# Patient Record
Sex: FEMALE | Race: WHITE | NOT HISPANIC OR LATINO
[De-identification: names, ages, dates, MRNs, and addresses within clinical notes are randomized per-mention and may not be internally consistent; named-entity substitution may affect disease eponyms.]

---

## 2017-09-28 ENCOUNTER — RECORDS - HEALTHEAST (OUTPATIENT)
Dept: ADMINISTRATIVE | Facility: OTHER | Age: 57
End: 2017-09-28

## 2019-03-08 ENCOUNTER — COMMUNICATION - HEALTHEAST (OUTPATIENT)
Dept: NEUROSURGERY | Facility: CLINIC | Age: 59
End: 2019-03-08

## 2019-03-11 ENCOUNTER — AMBULATORY - HEALTHEAST (OUTPATIENT)
Dept: NEUROSURGERY | Facility: CLINIC | Age: 59
End: 2019-03-11

## 2019-03-11 DIAGNOSIS — M54.9 BACK PAIN: ICD-10-CM

## 2019-04-18 ENCOUNTER — HOSPITAL ENCOUNTER (OUTPATIENT)
Dept: RADIOLOGY | Facility: CLINIC | Age: 59
Discharge: HOME | End: 2019-04-18
Attending: NEUROLOGICAL SURGERY

## 2019-04-18 ENCOUNTER — HOSPITAL ENCOUNTER (OUTPATIENT)
Dept: MRI IMAGING | Facility: CLINIC | Age: 59
Discharge: HOME | End: 2019-04-18
Attending: NEUROLOGICAL SURGERY

## 2019-04-18 DIAGNOSIS — M54.9 BACK PAIN: ICD-10-CM

## 2019-04-30 ENCOUNTER — OFFICE VISIT - HEALTHEAST (OUTPATIENT)
Dept: NEUROSURGERY | Facility: CLINIC | Age: 59
End: 2019-04-30

## 2019-04-30 DIAGNOSIS — M43.16 SPONDYLOLISTHESIS OF LUMBAR REGION: ICD-10-CM

## 2019-04-30 ASSESSMENT — MIFFLIN-ST. JEOR: SCORE: 1303.44

## 2019-05-02 ENCOUNTER — AMBULATORY - HEALTHEAST (OUTPATIENT)
Dept: NEUROSURGERY | Facility: CLINIC | Age: 59
End: 2019-05-02

## 2019-05-02 DIAGNOSIS — M54.9 BACK PAIN: ICD-10-CM

## 2019-05-03 ENCOUNTER — OFFICE VISIT - HEALTHEAST (OUTPATIENT)
Dept: PHYSICAL THERAPY | Facility: REHABILITATION | Age: 59
End: 2019-05-03

## 2019-05-03 DIAGNOSIS — R26.81 UNSTEADINESS ON FEET: ICD-10-CM

## 2019-05-03 DIAGNOSIS — M43.16 SPONDYLOLISTHESIS OF LUMBAR REGION: ICD-10-CM

## 2019-05-03 DIAGNOSIS — M62.81 GENERALIZED MUSCLE WEAKNESS: ICD-10-CM

## 2019-05-07 ENCOUNTER — OFFICE VISIT - HEALTHEAST (OUTPATIENT)
Dept: PHYSICAL THERAPY | Facility: REHABILITATION | Age: 59
End: 2019-05-07

## 2019-05-07 DIAGNOSIS — M43.16 SPONDYLOLISTHESIS OF LUMBAR REGION: ICD-10-CM

## 2019-05-07 DIAGNOSIS — M62.81 GENERALIZED MUSCLE WEAKNESS: ICD-10-CM

## 2019-05-07 DIAGNOSIS — R26.81 UNSTEADINESS ON FEET: ICD-10-CM

## 2019-05-10 ENCOUNTER — COMMUNICATION - HEALTHEAST (OUTPATIENT)
Dept: NEUROSURGERY | Facility: CLINIC | Age: 59
End: 2019-05-10

## 2019-05-10 ENCOUNTER — OFFICE VISIT - HEALTHEAST (OUTPATIENT)
Dept: PHYSICAL THERAPY | Facility: REHABILITATION | Age: 59
End: 2019-05-10

## 2019-05-10 DIAGNOSIS — M62.81 GENERALIZED MUSCLE WEAKNESS: ICD-10-CM

## 2019-05-10 DIAGNOSIS — R26.81 UNSTEADINESS ON FEET: ICD-10-CM

## 2019-05-10 DIAGNOSIS — M43.16 SPONDYLOLISTHESIS OF LUMBAR REGION: ICD-10-CM

## 2019-05-14 ENCOUNTER — OFFICE VISIT - HEALTHEAST (OUTPATIENT)
Dept: PHYSICAL THERAPY | Facility: REHABILITATION | Age: 59
End: 2019-05-14

## 2019-05-14 DIAGNOSIS — R26.81 UNSTEADINESS ON FEET: ICD-10-CM

## 2019-05-14 DIAGNOSIS — M43.16 SPONDYLOLISTHESIS OF LUMBAR REGION: ICD-10-CM

## 2019-05-14 DIAGNOSIS — M62.81 GENERALIZED MUSCLE WEAKNESS: ICD-10-CM

## 2019-05-17 ENCOUNTER — OFFICE VISIT - HEALTHEAST (OUTPATIENT)
Dept: PHYSICAL THERAPY | Facility: REHABILITATION | Age: 59
End: 2019-05-17

## 2019-05-17 DIAGNOSIS — M43.16 SPONDYLOLISTHESIS OF LUMBAR REGION: ICD-10-CM

## 2019-05-17 DIAGNOSIS — R26.81 UNSTEADINESS ON FEET: ICD-10-CM

## 2019-05-17 DIAGNOSIS — M62.81 GENERALIZED MUSCLE WEAKNESS: ICD-10-CM

## 2019-05-24 ENCOUNTER — OFFICE VISIT - HEALTHEAST (OUTPATIENT)
Dept: PHYSICAL THERAPY | Facility: REHABILITATION | Age: 59
End: 2019-05-24

## 2019-05-24 DIAGNOSIS — R26.81 UNSTEADINESS ON FEET: ICD-10-CM

## 2019-05-24 DIAGNOSIS — M62.81 GENERALIZED MUSCLE WEAKNESS: ICD-10-CM

## 2019-05-24 DIAGNOSIS — M43.16 SPONDYLOLISTHESIS OF LUMBAR REGION: ICD-10-CM

## 2019-05-28 ENCOUNTER — OFFICE VISIT - HEALTHEAST (OUTPATIENT)
Dept: PHYSICAL THERAPY | Facility: REHABILITATION | Age: 59
End: 2019-05-28

## 2019-05-28 DIAGNOSIS — R26.81 UNSTEADINESS ON FEET: ICD-10-CM

## 2019-05-28 DIAGNOSIS — M43.16 SPONDYLOLISTHESIS OF LUMBAR REGION: ICD-10-CM

## 2019-05-28 DIAGNOSIS — M62.81 GENERALIZED MUSCLE WEAKNESS: ICD-10-CM

## 2019-05-31 ENCOUNTER — OFFICE VISIT - HEALTHEAST (OUTPATIENT)
Dept: PHYSICAL THERAPY | Facility: REHABILITATION | Age: 59
End: 2019-05-31

## 2019-05-31 DIAGNOSIS — R26.81 UNSTEADINESS ON FEET: ICD-10-CM

## 2019-05-31 DIAGNOSIS — M43.16 SPONDYLOLISTHESIS OF LUMBAR REGION: ICD-10-CM

## 2019-05-31 DIAGNOSIS — M62.81 GENERALIZED MUSCLE WEAKNESS: ICD-10-CM

## 2019-06-10 ENCOUNTER — COMMUNICATION - HEALTHEAST (OUTPATIENT)
Dept: NEUROSURGERY | Facility: CLINIC | Age: 59
End: 2019-06-10

## 2019-06-13 ENCOUNTER — AMBULATORY - HEALTHEAST (OUTPATIENT)
Dept: NEUROSURGERY | Facility: CLINIC | Age: 59
End: 2019-06-13

## 2019-06-14 ENCOUNTER — AMBULATORY - HEALTHEAST (OUTPATIENT)
Dept: NEUROSURGERY | Facility: CLINIC | Age: 59
End: 2019-06-14

## 2019-06-14 DIAGNOSIS — M25.551 BILATERAL HIP PAIN: ICD-10-CM

## 2019-06-14 DIAGNOSIS — M25.552 BILATERAL HIP PAIN: ICD-10-CM

## 2019-06-14 DIAGNOSIS — M54.9 BACK PAIN: ICD-10-CM

## 2019-07-01 ENCOUNTER — AMBULATORY - HEALTHEAST (OUTPATIENT)
Dept: NEUROSURGERY | Facility: CLINIC | Age: 59
End: 2019-07-01

## 2019-07-01 ENCOUNTER — COMMUNICATION - HEALTHEAST (OUTPATIENT)
Dept: NEUROSURGERY | Facility: CLINIC | Age: 59
End: 2019-07-01

## 2019-07-01 DIAGNOSIS — M54.16 LUMBAR RADICULOPATHY: ICD-10-CM

## 2019-07-02 ENCOUNTER — AMBULATORY - HEALTHEAST (OUTPATIENT)
Dept: NEUROSURGERY | Facility: CLINIC | Age: 59
End: 2019-07-02

## 2019-07-17 ENCOUNTER — AMBULATORY - HEALTHEAST (OUTPATIENT)
Dept: NEUROSURGERY | Facility: CLINIC | Age: 59
End: 2019-07-17

## 2019-07-31 ENCOUNTER — HOSPITAL ENCOUNTER (OUTPATIENT)
Dept: PHYSICAL MEDICINE AND REHAB | Facility: CLINIC | Age: 59
Discharge: HOME | End: 2019-07-31
Attending: NEUROLOGICAL SURGERY

## 2019-07-31 DIAGNOSIS — M54.16 LUMBAR RADICULOPATHY: ICD-10-CM

## 2019-07-31 RX ORDER — DULOXETIN HYDROCHLORIDE 60 MG/1
60 CAPSULE, DELAYED RELEASE ORAL DAILY
Status: SHIPPED | COMMUNITY
Start: 2019-07-31

## 2019-07-31 RX ORDER — BUPROPION HYDROCHLORIDE 150 MG/1
TABLET ORAL
Refills: 1 | Status: SHIPPED | COMMUNITY
Start: 2019-06-27

## 2019-08-15 ENCOUNTER — COMMUNICATION - HEALTHEAST (OUTPATIENT)
Dept: NEUROSURGERY | Facility: CLINIC | Age: 59
End: 2019-08-15

## 2019-10-22 ENCOUNTER — COMMUNICATION - HEALTHEAST (OUTPATIENT)
Dept: NEUROSURGERY | Facility: CLINIC | Age: 59
End: 2019-10-22

## 2019-10-22 ENCOUNTER — AMBULATORY - HEALTHEAST (OUTPATIENT)
Dept: NEUROSURGERY | Facility: CLINIC | Age: 59
End: 2019-10-22

## 2019-10-22 DIAGNOSIS — M54.16 LUMBAR RADICULOPATHY: ICD-10-CM

## 2019-10-22 DIAGNOSIS — M47.16 OTHER SPONDYLOSIS WITH MYELOPATHY, LUMBAR REGION: ICD-10-CM

## 2019-11-07 ENCOUNTER — COMMUNICATION - HEALTHEAST (OUTPATIENT)
Dept: PHYSICAL MEDICINE AND REHAB | Facility: CLINIC | Age: 59
End: 2019-11-07

## 2019-11-25 ENCOUNTER — HOSPITAL ENCOUNTER (OUTPATIENT)
Dept: PHYSICAL MEDICINE AND REHAB | Facility: CLINIC | Age: 59
Discharge: HOME | End: 2019-11-25
Attending: NEUROLOGICAL SURGERY

## 2019-11-25 DIAGNOSIS — M54.16 LUMBAR RADICULOPATHY: ICD-10-CM

## 2019-11-25 DIAGNOSIS — M47.16 OTHER SPONDYLOSIS WITH MYELOPATHY, LUMBAR REGION: ICD-10-CM

## 2019-11-25 RX ORDER — FAMOTIDINE 40 MG/1
40 TABLET, FILM COATED ORAL DAILY
Status: SHIPPED | COMMUNITY
Start: 2019-11-25

## 2019-12-13 ENCOUNTER — HOSPITAL ENCOUNTER (OUTPATIENT)
Dept: PHYSICAL MEDICINE AND REHAB | Facility: CLINIC | Age: 59
Discharge: HOME | End: 2019-12-13
Attending: PHYSICAL MEDICINE & REHABILITATION

## 2019-12-13 DIAGNOSIS — M47.816 ARTHROPATHY OF LUMBAR FACET JOINT: ICD-10-CM

## 2019-12-13 DIAGNOSIS — M54.50 LUMBAR SPINE PAIN: ICD-10-CM

## 2019-12-13 DIAGNOSIS — M79.18 MYOFASCIAL PAIN: ICD-10-CM

## 2019-12-13 DIAGNOSIS — M43.16 SPONDYLOLISTHESIS OF LUMBAR REGION: ICD-10-CM

## 2019-12-13 ASSESSMENT — MIFFLIN-ST. JEOR: SCORE: 1289.83

## 2020-01-07 ENCOUNTER — HOSPITAL ENCOUNTER (OUTPATIENT)
Dept: PHYSICAL MEDICINE AND REHAB | Facility: CLINIC | Age: 60
Discharge: HOME | End: 2020-01-07
Attending: PHYSICAL MEDICINE & REHABILITATION

## 2020-01-07 DIAGNOSIS — M43.16 SPONDYLOLISTHESIS OF LUMBAR REGION: ICD-10-CM

## 2020-01-07 DIAGNOSIS — M54.50 LUMBAR SPINE PAIN: ICD-10-CM

## 2020-01-07 DIAGNOSIS — M47.816 ARTHROPATHY OF LUMBAR FACET JOINT: ICD-10-CM

## 2020-04-16 ENCOUNTER — COMMUNICATION - HEALTHEAST (OUTPATIENT)
Dept: PHYSICAL MEDICINE AND REHAB | Facility: CLINIC | Age: 60
End: 2020-04-16

## 2020-04-27 ENCOUNTER — HOSPITAL ENCOUNTER (OUTPATIENT)
Dept: PHYSICAL MEDICINE AND REHAB | Facility: CLINIC | Age: 60
Discharge: HOME | End: 2020-04-27
Attending: PHYSICAL MEDICINE & REHABILITATION

## 2020-04-27 ENCOUNTER — COMMUNICATION - HEALTHEAST (OUTPATIENT)
Dept: TELEHEALTH | Facility: CLINIC | Age: 60
End: 2020-04-27

## 2020-04-27 DIAGNOSIS — M79.18 MYOFASCIAL PAIN: ICD-10-CM

## 2020-04-27 DIAGNOSIS — M43.16 SPONDYLOLISTHESIS OF LUMBAR REGION: ICD-10-CM

## 2020-04-27 DIAGNOSIS — M54.50 LUMBAR SPINE PAIN: ICD-10-CM

## 2020-04-27 DIAGNOSIS — M47.816 ARTHROPATHY OF LUMBAR FACET JOINT: ICD-10-CM

## 2020-04-27 RX ORDER — METHOCARBAMOL 500 MG/1
500 TABLET, FILM COATED ORAL 3 TIMES DAILY PRN
Qty: 30 TABLET | Refills: 0 | Status: SHIPPED | OUTPATIENT
Start: 2020-04-27

## 2020-04-28 ENCOUNTER — COMMUNICATION - HEALTHEAST (OUTPATIENT)
Dept: PHYSICAL MEDICINE AND REHAB | Facility: CLINIC | Age: 60
End: 2020-04-28

## 2020-04-28 DIAGNOSIS — M79.18 MYOFASCIAL MUSCLE PAIN: ICD-10-CM

## 2020-06-30 ENCOUNTER — COMMUNICATION - HEALTHEAST (OUTPATIENT)
Dept: TELEHEALTH | Facility: CLINIC | Age: 60
End: 2020-06-30

## 2020-06-30 ENCOUNTER — HOSPITAL ENCOUNTER (OUTPATIENT)
Dept: PHYSICAL MEDICINE AND REHAB | Facility: CLINIC | Age: 60
Discharge: HOME | End: 2020-06-30
Attending: PHYSICAL MEDICINE & REHABILITATION

## 2020-06-30 DIAGNOSIS — M43.16 SPONDYLOLISTHESIS OF LUMBAR REGION: ICD-10-CM

## 2020-06-30 DIAGNOSIS — M47.816 ARTHROPATHY OF LUMBAR FACET JOINT: ICD-10-CM

## 2020-06-30 DIAGNOSIS — M54.50 LUMBAR SPINE PAIN: ICD-10-CM

## 2020-07-17 ENCOUNTER — HOSPITAL ENCOUNTER (OUTPATIENT)
Dept: PHYSICAL MEDICINE AND REHAB | Facility: CLINIC | Age: 60
Discharge: HOME | End: 2020-07-17
Attending: PHYSICAL MEDICINE & REHABILITATION

## 2020-07-17 DIAGNOSIS — M79.18 MYOFASCIAL PAIN: ICD-10-CM

## 2020-07-17 DIAGNOSIS — M48.061 BILATERAL STENOSIS OF LATERAL RECESS OF LUMBAR SPINE: ICD-10-CM

## 2020-07-17 DIAGNOSIS — M47.816 ARTHROPATHY OF LUMBAR FACET JOINT: ICD-10-CM

## 2020-07-17 DIAGNOSIS — M48.061 FORAMINAL STENOSIS OF LUMBAR REGION: ICD-10-CM

## 2020-07-17 DIAGNOSIS — M43.16 SPONDYLOLISTHESIS OF LUMBAR REGION: ICD-10-CM

## 2020-07-17 DIAGNOSIS — M79.18 MYOFASCIAL MUSCLE PAIN: ICD-10-CM

## 2020-07-17 DIAGNOSIS — M54.16 LUMBAR RADICULAR PAIN: ICD-10-CM

## 2020-07-17 DIAGNOSIS — M70.61 TROCHANTERIC BURSITIS OF BOTH HIPS: ICD-10-CM

## 2020-07-17 DIAGNOSIS — M70.62 TROCHANTERIC BURSITIS OF BOTH HIPS: ICD-10-CM

## 2020-07-17 ASSESSMENT — MIFFLIN-ST. JEOR: SCORE: 1289.83

## 2020-07-28 ENCOUNTER — HOSPITAL ENCOUNTER (OUTPATIENT)
Dept: PHYSICAL MEDICINE AND REHAB | Facility: CLINIC | Age: 60
Discharge: HOME | End: 2020-07-28
Attending: PHYSICAL MEDICINE & REHABILITATION

## 2020-07-28 DIAGNOSIS — M54.16 LUMBAR RADICULAR PAIN: ICD-10-CM

## 2020-07-28 DIAGNOSIS — M48.061 FORAMINAL STENOSIS OF LUMBAR REGION: ICD-10-CM

## 2020-08-11 ENCOUNTER — HOSPITAL ENCOUNTER (OUTPATIENT)
Dept: PHYSICAL MEDICINE AND REHAB | Facility: CLINIC | Age: 60
Discharge: HOME | End: 2020-08-11
Attending: PHYSICAL MEDICINE & REHABILITATION

## 2020-08-11 DIAGNOSIS — M53.3 SACROILIAC JOINT PAIN: ICD-10-CM

## 2020-08-11 DIAGNOSIS — M54.16 LUMBAR RADICULAR PAIN: ICD-10-CM

## 2020-08-11 DIAGNOSIS — M48.061 FORAMINAL STENOSIS OF LUMBAR REGION: ICD-10-CM

## 2020-08-11 DIAGNOSIS — M43.16 SPONDYLOLISTHESIS OF LUMBAR REGION: ICD-10-CM

## 2020-08-11 DIAGNOSIS — M47.816 ARTHROPATHY OF LUMBAR FACET JOINT: ICD-10-CM

## 2020-08-11 DIAGNOSIS — M79.18 MYOFASCIAL PAIN: ICD-10-CM

## 2020-08-15 ENCOUNTER — HOSPITAL ENCOUNTER (OUTPATIENT)
Dept: MRI IMAGING | Facility: CLINIC | Age: 60
Discharge: HOME | End: 2020-08-15
Attending: PHYSICAL MEDICINE & REHABILITATION

## 2020-08-15 ENCOUNTER — HOSPITAL ENCOUNTER (OUTPATIENT)
Dept: RADIOLOGY | Facility: CLINIC | Age: 60
Discharge: HOME | End: 2020-08-15
Attending: PHYSICAL MEDICINE & REHABILITATION

## 2020-08-15 DIAGNOSIS — M43.16 SPONDYLOLISTHESIS OF LUMBAR REGION: ICD-10-CM

## 2020-08-15 DIAGNOSIS — M48.061 FORAMINAL STENOSIS OF LUMBAR REGION: ICD-10-CM

## 2020-08-15 DIAGNOSIS — M54.16 LUMBAR RADICULAR PAIN: ICD-10-CM

## 2020-08-15 DIAGNOSIS — M47.816 ARTHROPATHY OF LUMBAR FACET JOINT: ICD-10-CM

## 2020-08-17 ENCOUNTER — COMMUNICATION - HEALTHEAST (OUTPATIENT)
Dept: PHYSICAL MEDICINE AND REHAB | Facility: CLINIC | Age: 60
End: 2020-08-17

## 2020-08-18 ENCOUNTER — HOSPITAL ENCOUNTER (OUTPATIENT)
Dept: PHYSICAL MEDICINE AND REHAB | Facility: CLINIC | Age: 60
Discharge: HOME | End: 2020-08-18
Attending: PHYSICAL MEDICINE & REHABILITATION

## 2020-08-18 DIAGNOSIS — M53.3 SACROILIAC JOINT PAIN: ICD-10-CM

## 2020-09-01 ENCOUNTER — HOSPITAL ENCOUNTER (OUTPATIENT)
Dept: PHYSICAL MEDICINE AND REHAB | Facility: CLINIC | Age: 60
Discharge: HOME | End: 2020-09-01
Attending: PHYSICAL MEDICINE & REHABILITATION

## 2020-09-01 DIAGNOSIS — M53.3 SACROILIAC JOINT PAIN: ICD-10-CM

## 2020-09-01 DIAGNOSIS — M47.816 ARTHROPATHY OF LUMBAR FACET JOINT: ICD-10-CM

## 2020-09-01 DIAGNOSIS — M43.16 SPONDYLOLISTHESIS OF LUMBAR REGION: ICD-10-CM

## 2020-09-01 DIAGNOSIS — M70.61 TROCHANTERIC BURSITIS OF BOTH HIPS: ICD-10-CM

## 2020-09-01 DIAGNOSIS — M54.16 LUMBAR RADICULAR PAIN: ICD-10-CM

## 2020-09-01 DIAGNOSIS — M79.18 MYOFASCIAL PAIN: ICD-10-CM

## 2020-09-01 DIAGNOSIS — M48.061 FORAMINAL STENOSIS OF LUMBAR REGION: ICD-10-CM

## 2020-09-01 DIAGNOSIS — M70.62 TROCHANTERIC BURSITIS OF BOTH HIPS: ICD-10-CM

## 2020-09-15 ENCOUNTER — OFFICE VISIT - HEALTHEAST (OUTPATIENT)
Dept: NEUROSURGERY | Facility: CLINIC | Age: 60
End: 2020-09-15

## 2020-09-15 DIAGNOSIS — M85.80 OTHER SPECIFIED DISORDERS OF BONE DENSITY AND STRUCTURE, UNSPECIFIED SITE: ICD-10-CM

## 2020-09-15 DIAGNOSIS — M54.16 LUMBAR RADICULOPATHY: ICD-10-CM

## 2020-09-15 ASSESSMENT — MIFFLIN-ST. JEOR: SCORE: 1312.51

## 2020-09-17 ENCOUNTER — COMMUNICATION - HEALTHEAST (OUTPATIENT)
Dept: NEUROSURGERY | Facility: CLINIC | Age: 60
End: 2020-09-17

## 2020-09-21 ENCOUNTER — OFFICE VISIT - HEALTHEAST (OUTPATIENT)
Dept: PHYSICAL THERAPY | Facility: REHABILITATION | Age: 60
End: 2020-09-21

## 2020-09-21 DIAGNOSIS — M43.16 SPONDYLOLISTHESIS OF LUMBAR REGION: ICD-10-CM

## 2020-09-21 DIAGNOSIS — M48.061 FORAMINAL STENOSIS OF LUMBAR REGION: ICD-10-CM

## 2020-09-21 DIAGNOSIS — M79.18 MYOFASCIAL PAIN: ICD-10-CM

## 2020-09-21 DIAGNOSIS — M70.61 TROCHANTERIC BURSITIS OF BOTH HIPS: ICD-10-CM

## 2020-09-21 DIAGNOSIS — M70.62 TROCHANTERIC BURSITIS OF BOTH HIPS: ICD-10-CM

## 2020-09-21 DIAGNOSIS — M53.3 SACROILIAC JOINT PAIN: ICD-10-CM

## 2020-09-21 DIAGNOSIS — M47.816 ARTHROPATHY OF LUMBAR FACET JOINT: ICD-10-CM

## 2020-09-21 DIAGNOSIS — M54.16 LUMBAR RADICULAR PAIN: ICD-10-CM

## 2020-09-30 ENCOUNTER — OFFICE VISIT - HEALTHEAST (OUTPATIENT)
Dept: PHYSICAL THERAPY | Facility: REHABILITATION | Age: 60
End: 2020-09-30

## 2020-09-30 DIAGNOSIS — M54.16 LUMBAR RADICULAR PAIN: ICD-10-CM

## 2020-09-30 DIAGNOSIS — M43.16 SPONDYLOLISTHESIS OF LUMBAR REGION: ICD-10-CM

## 2020-09-30 DIAGNOSIS — M70.62 TROCHANTERIC BURSITIS OF BOTH HIPS: ICD-10-CM

## 2020-09-30 DIAGNOSIS — M53.3 SACROILIAC JOINT PAIN: ICD-10-CM

## 2020-09-30 DIAGNOSIS — M47.816 ARTHROPATHY OF LUMBAR FACET JOINT: ICD-10-CM

## 2020-09-30 DIAGNOSIS — M70.61 TROCHANTERIC BURSITIS OF BOTH HIPS: ICD-10-CM

## 2020-09-30 DIAGNOSIS — M79.18 MYOFASCIAL PAIN: ICD-10-CM

## 2020-09-30 DIAGNOSIS — M48.061 FORAMINAL STENOSIS OF LUMBAR REGION: ICD-10-CM

## 2020-10-05 ENCOUNTER — RECORDS - HEALTHEAST (OUTPATIENT)
Dept: ADMINISTRATIVE | Facility: OTHER | Age: 60
End: 2020-10-05

## 2020-10-07 ENCOUNTER — OFFICE VISIT - HEALTHEAST (OUTPATIENT)
Dept: PHYSICAL THERAPY | Facility: REHABILITATION | Age: 60
End: 2020-10-07

## 2020-10-07 DIAGNOSIS — M48.061 FORAMINAL STENOSIS OF LUMBAR REGION: ICD-10-CM

## 2020-10-07 DIAGNOSIS — M79.18 MYOFASCIAL PAIN: ICD-10-CM

## 2020-10-07 DIAGNOSIS — M54.16 LUMBAR RADICULAR PAIN: ICD-10-CM

## 2020-10-07 DIAGNOSIS — M53.3 SACROILIAC JOINT PAIN: ICD-10-CM

## 2020-10-07 DIAGNOSIS — M47.816 ARTHROPATHY OF LUMBAR FACET JOINT: ICD-10-CM

## 2020-10-26 ENCOUNTER — COMMUNICATION - HEALTHEAST (OUTPATIENT)
Dept: PHYSICAL MEDICINE AND REHAB | Facility: CLINIC | Age: 60
End: 2020-10-26

## 2020-10-26 DIAGNOSIS — M79.18 MYOFASCIAL MUSCLE PAIN: ICD-10-CM

## 2021-01-03 ENCOUNTER — COMMUNICATION - HEALTHEAST (OUTPATIENT)
Dept: PHYSICAL MEDICINE AND REHAB | Facility: CLINIC | Age: 61
End: 2021-01-03

## 2021-01-03 DIAGNOSIS — M79.18 MYOFASCIAL MUSCLE PAIN: ICD-10-CM

## 2021-02-08 ENCOUNTER — COMMUNICATION - HEALTHEAST (OUTPATIENT)
Dept: PHYSICAL MEDICINE AND REHAB | Facility: CLINIC | Age: 61
End: 2021-02-08

## 2021-02-08 DIAGNOSIS — M79.18 MYOFASCIAL MUSCLE PAIN: ICD-10-CM

## 2021-03-01 ENCOUNTER — COMMUNICATION - HEALTHEAST (OUTPATIENT)
Dept: PHYSICAL MEDICINE AND REHAB | Facility: CLINIC | Age: 61
End: 2021-03-01

## 2021-03-01 DIAGNOSIS — M79.18 MYOFASCIAL MUSCLE PAIN: ICD-10-CM

## 2021-03-29 ENCOUNTER — COMMUNICATION - HEALTHEAST (OUTPATIENT)
Dept: PHYSICAL MEDICINE AND REHAB | Facility: CLINIC | Age: 61
End: 2021-03-29

## 2021-03-29 DIAGNOSIS — M79.18 MYOFASCIAL MUSCLE PAIN: ICD-10-CM

## 2021-04-21 ENCOUNTER — COMMUNICATION - HEALTHEAST (OUTPATIENT)
Dept: PHYSICAL MEDICINE AND REHAB | Facility: CLINIC | Age: 61
End: 2021-04-21

## 2021-04-21 DIAGNOSIS — M79.18 MYOFASCIAL MUSCLE PAIN: ICD-10-CM

## 2021-05-13 ENCOUNTER — COMMUNICATION - HEALTHEAST (OUTPATIENT)
Dept: PHYSICAL MEDICINE AND REHAB | Facility: CLINIC | Age: 61
End: 2021-05-13

## 2021-05-13 DIAGNOSIS — M79.18 MYOFASCIAL MUSCLE PAIN: ICD-10-CM

## 2021-05-14 RX ORDER — CYCLOBENZAPRINE HCL 5 MG
TABLET ORAL
Qty: 60 TABLET | Refills: 0 | Status: SHIPPED | OUTPATIENT
Start: 2021-05-14

## 2021-05-25 ENCOUNTER — RECORDS - HEALTHEAST (OUTPATIENT)
Dept: ADMINISTRATIVE | Facility: CLINIC | Age: 61
End: 2021-05-25

## 2021-05-28 NOTE — TELEPHONE ENCOUNTER
PT called to state that she had an order placed for a brace and will not be using it because it is too expensive. She stated that she bought a brace from [employer]. If you have any questions or comments please call Mary @ [phone]

## 2021-05-28 NOTE — PROGRESS NOTES
"Neurosurgery consultation was requested by: Dr. Guo for evaluation of lumbar stenosis  Last seen in 2015  Pain: presents in bilateral low back \"for years\", recently has become more intense and persistent  Radicular Pain is present: in both legs R>L from the buttocks down   Lhermitte sign: denies  Motor complaints: weakness in both legs R>L  Sensory complaints: paresthesias in hands and feet  Gait and balance issues: unable to walk distance due to pain  Bowel or bladder issues: denies incontinence or saddle anesthesia  Duration of SX is: chronic  The symptoms are worse with: standing and walking  The symptoms are better with: laying  Injury: denies  Severity is: moderate  Patient has tried the following conservative measures: none  CARMEN score is: 48%  Susannah Franco RN, CNRN      "

## 2021-05-28 NOTE — PROGRESS NOTES
"[facility] Daily Progress     Patient Name: Leda Verde  Date: 5/14/2019  Visit #: 3  PTA visit #: 1  Referral Diagnosis: spondylolisthesis of lumbar region  Referring provider: Damien Collins*  Visit Diagnosis:     ICD-10-CM    1. Spondylolisthesis of lumbar region M43.16    2. Generalized muscle weakness M62.81    3. Unsteadiness on feet R26.81        Past Medical History:   Diagnosis Date     Anxiety      Depression      Hyperlipidemia          Assessment:   Patient tolerated session well today without increase in pain.    HEP/POC compliance is  good .  Patient demonstrates understanding/independence with home program.  Patient is benefitting from skilled physical therapy and is making steady progress toward functional goals.  Patient is appropriate to continue with skilled physical therapy intervention, as indicated by initial plan of care.    Goal Status:  Pt. will demonstrate/verbalize independence in self-management of condition in : 12 weeks  Pt. will be independent with home exercise program in : 12 weeks  Pt. will have improved quality of sleep: with less pain;waking less times/night;in 6 weeks  Pt. will be able to walk : 30 minutes;with less difficulty;with less pain;for household mobility;in 12 weeks;for community mobility;for exercise/recreation  Patient will stand : 30 minutes;with less pain;with less difficultty;in 6 weeks;for home chores    Patient will decrease : CARMEN score;by _ points;for improved quality of function;for improved quality of life;in 12 weeks  by ___ points: 6      Plan / Patient Education:   Pt did well with all exercises and balance drills.  Continue with initial plan of care.  Progress with home program as tolerated.    Exercises:  Exercise #1: SKTC and piriformis stretch 30\" holds x3 B  Comment #1: glut squeeze x10 with 5\" holds  Exercise #2: abd set x10 with 5\" holds  Comment #2: clamshell 5\" holds until fatigue B  Exercise #3: sit to stands until " "fatigue  Comment #3: SLS and tandem 1' B    NO NU' STEP PER PATIENT REQUEST- IT BOTHERS THE DISC IN HER NECK    Pt to hold on doing the SLS for now to see if that is causing L knee pain, will recheck this at her next appointment.    Subjective:   Pt states she is \"sore.\"   Pt states her L knee is bothering her. Pt states she is having L knee pain (has a HO of L knee pain) She thinks the SLS has been bothering her knee.  Pt did get a heel lift at [employer]. Pt with an Adjust a  heel lift. Pt removed two of the layers off.   Pain Ratin/10          Objective:       Gait: Pt with no AD today    Treatment Today     TREATMENT MINUTES COMMENTS   Evaluation     Self-care/ Home management     Manual therapy     Neuromuscular Re-education 15 On flat: rhomberg, 1/2 tandem 1' B, tandem 1' each B. On foam: 1/2 tandem and rhomberg 1' each. Tandem walking and cross overs  in ll bars x6 bouts each   Therapeutic Activity     Therapeutic Exercises 12 Recumbant bike WL 2.0 x4'  Added L2 band to clamshell   Added to HEP:  -bridge   Gait training     Modality__________________                Total 27    Blank areas are intentional and mean the treatment did not include these items.       Pramod Carreno, PT, DPT  2019  "

## 2021-05-28 NOTE — PROGRESS NOTES
"[facility] Daily Progress     Patient Name: Leda Verde  Date: 5/17/2019  Visit #: 5  PTA visit #: 1  Referral Diagnosis: spondylolisthesis of lumbar region  Referring provider: Damien Collins*  Visit Diagnosis:     ICD-10-CM    1. Spondylolisthesis of lumbar region M43.16    2. Generalized muscle weakness M62.81    3. Unsteadiness on feet R26.81        Past Medical History:   Diagnosis Date     Anxiety      Depression      Hyperlipidemia          Assessment:   Patient tolerated session well today without increase in pain.  Patient doesn't feel that she's made any significant progress thus far since beginning therapy.    HEP/POC compliance is  good .  Patient demonstrates understanding/independence with home program.  Patient is benefitting from skilled physical therapy and is making steady progress toward functional goals.  Patient is appropriate to continue with skilled physical therapy intervention, as indicated by initial plan of care.    Goal Status:  Pt. will demonstrate/verbalize independence in self-management of condition in : 12 weeks  Pt. will be independent with home exercise program in : 12 weeks  Pt. will have improved quality of sleep: with less pain;waking less times/night;in 6 weeks  Pt. will be able to walk : 30 minutes;with less difficulty;with less pain;for household mobility;in 12 weeks;for community mobility;for exercise/recreation  Patient will stand : 30 minutes;with less pain;with less difficultty;in 6 weeks;for home chores    Patient will decrease : CARMEN score;by _ points;for improved quality of function;for improved quality of life;in 12 weeks  by ___ points: 6      Plan / Patient Education:   Pt did well with all exercises and balance drills.  Continue with initial plan of care.  Progress with home program as tolerated.    Exercises:  Exercise #1: SKTC and piriformis stretch 30\" holds x3 B  Comment #1: glut squeeze x10 with 5\" holds  Exercise #2: abd set " "+march  Comment #2: clamshell 5\" holds until fatigue B w/ L2  Exercise #3: sit to stands until fatigue  Comment #3: tandem 1' B  Exercise #4: bridge w/ hip add using ball x20-30    NO NU' STEP PER PATIENT REQUEST- IT BOTHERS THE DISC IN HER NECK    Plan for next session: static/dynamic balance. Monster walk w/ band.     Subjective:     Pain Rating: more tired than anything    Her back and her hips have been sore. Is going to get consultation possibly for injections in her hips. Doing exercises 1x/day. Icing doesn't seem to help with pain relief.        Objective:     Gait: Pt with no AD today.     Treatment Today     TREATMENT MINUTES COMMENTS   Evaluation     Self-care/ Home management     Manual therapy     Neuromuscular Re-education     Therapeutic Activity     Therapeutic Exercises 30 Recumbant bike WL 3.0 x5'. Discussed progress. Progressed HEP- see flow sheet for details. Performed in clinic but not added to HEP: pilates arch with 3# weight. Adjusted heel lift for patient and assessed gait with it. Answered patient question/concerns.     Gait training     Modality__________________                Total 30    Blank areas are intentional and mean the treatment did not include these items.       Pramod Carreno, PT, DPT  5/17/2019  "

## 2021-05-28 NOTE — PROGRESS NOTES
"[facility] Daily Progress     Patient Name: Leda Verde  Date: 5/7/2019  Visit #: 2  PTA visit #:  -  Referral Diagnosis: spondylolisthesis of lumbar region  Referring provider: Damien Collins*  Visit Diagnosis:     ICD-10-CM    1. Spondylolisthesis of lumbar region M43.16    2. Generalized muscle weakness M62.81    3. Unsteadiness on feet R26.81        Past Medical History:   Diagnosis Date     Anxiety      Depression      Hyperlipidemia          Assessment:   Patient is 6' late to appt today. She tolerated today's session well without increase in symptoms.    HEP/POC compliance is  good .  Patient demonstrates understanding/independence with home program.    Goal Status:  Pt. will demonstrate/verbalize independence in self-management of condition in : 12 weeks  Pt. will be independent with home exercise program in : 12 weeks  Pt. will have improved quality of sleep: with less pain;waking less times/night;in 6 weeks  Pt. will be able to walk : 30 minutes;with less difficulty;with less pain;for household mobility;in 12 weeks;for community mobility;for exercise/recreation  Patient will stand : 30 minutes;with less pain;with less difficultty;in 6 weeks;for home chores    Patient will decrease : CARMEN score;by _ points;for improved quality of function;for improved quality of life;in 12 weeks  by ___ points: 6      Plan / Patient Education:     Continue with initial plan of care.  Progress with home program as tolerated.    Exercises:  Exercise #1: SKTC and piriformis stretch 30\" holds x3 B  Comment #1: glut squeeze x10 with 5\" holds  Exercise #2: abd set x10 with 5\" holds  Comment #2: clamshell 5\" holds until fatigue B  Exercise #3: sit to stands until fatigue    NO NU' STEP PER PATIENT REQUEST- IT BOTHERS THE DISC IN HER NECK    Plan for next visit: recumabnt bike, look at heel lift with gait, add band to clamshell, bridge,  foam and and tandem balance    Subjective:     Pain Rating: " 3/10    Doing her HEP 2x/day. Getting brace from [facility] due to her old brace not big enough.      Objective:     No increase in pain w/ today's session.      Treatment Today     TREATMENT MINUTES COMMENTS   Evaluation     Self-care/ Home management     Manual therapy     Neuromuscular Re-education 4 On flat: rhomberg and 1/2 tandem 1' B.   Therapeutic Activity     Therapeutic Exercises 19 Discussed progress. Objective measures taken. Progressed HEP- see flow sheet for details. Discussed icing recommendations today. Recommend patient use a SEC to prevent falls. Nu' step 5' L5.    Gait training     Modality__________________                Total 23    Blank areas are intentional and mean the treatment did not include these items.       Pramod Carreno, PT, DPT  5/7/2019

## 2021-05-28 NOTE — PROGRESS NOTES
"[facility] Daily Progress     Patient Name: Leda Verde  Date: 5/10/2019  Visit #: 2  PTA visit #:  -  Referral Diagnosis: spondylolisthesis of lumbar region  Referring provider: Damien Collins*  Visit Diagnosis:     ICD-10-CM    1. Spondylolisthesis of lumbar region M43.16    2. Generalized muscle weakness M62.81    3. Unsteadiness on feet R26.81        Past Medical History:   Diagnosis Date     Anxiety      Depression      Hyperlipidemia          Assessment:   Patient tolerated session well today without increase in pain.    HEP/POC compliance is  good .  Patient demonstrates understanding/independence with home program.  Patient is benefitting from skilled physical therapy and is making steady progress toward functional goals.  Patient is appropriate to continue with skilled physical therapy intervention, as indicated by initial plan of care.    Goal Status:  Pt. will demonstrate/verbalize independence in self-management of condition in : 12 weeks  Pt. will be independent with home exercise program in : 12 weeks  Pt. will have improved quality of sleep: with less pain;waking less times/night;in 6 weeks  Pt. will be able to walk : 30 minutes;with less difficulty;with less pain;for household mobility;in 12 weeks;for community mobility;for exercise/recreation  Patient will stand : 30 minutes;with less pain;with less difficultty;in 6 weeks;for home chores    Patient will decrease : CARMEN score;by _ points;for improved quality of function;for improved quality of life;in 12 weeks  by ___ points: 6      Plan / Patient Education:     Continue with initial plan of care.  Progress with home program as tolerated.    Exercises:  Exercise #1: SKTC and piriformis stretch 30\" holds x3 B  Comment #1: glut squeeze x10 with 5\" holds  Exercise #2: abd set x10 with 5\" holds  Comment #2: clamshell 5\" holds until fatigue B  Exercise #3: sit to stands until fatigue  Comment #3: SLS and tandem 1' B    NO NU' " STEP PER PATIENT REQUEST- IT BOTHERS THE DISC IN HER NECK    Plan for next visit: recumabnt bike, look at heel lift with gait, add band to clamshell, bridge,  foam and and tandem balance    Subjective:     Pain Rating: 3/10    Doing her HEP 2x/day. Getting brace from [facility] due to her old brace not big enough.      Objective:     No increase in pain w/ today's session  Gait: use of SEC into clinic but not used around therapy gym      Treatment Today     TREATMENT MINUTES COMMENTS   Evaluation     Self-care/ Home management     Manual therapy     Neuromuscular Re-education 25 On flat: rhomberg, 1/2 tandem 1' B, tandem, SLS 1' each B. On foam: 1/2 tandem and rhomberg 1' each. Tandem walking and side stepping in ll bars. Out of ll bars: marching 15' x4, heel/toe walking. On trampoline: bouncing w/ 1HH and 2HH, marching x20 with 1HH.   Therapeutic Activity     Therapeutic Exercises     Gait training     Modality__________________                Total 25    Blank areas are intentional and mean the treatment did not include these items.       Pramod Carreno, PT, DPT  5/10/2019

## 2021-05-28 NOTE — PROGRESS NOTES
NEUROSURGERY CONSULTATION NOTE    4/29/2019     Leda Verde is a 59 y.o. female who is sent to us in consultation by Andrew Guo     for evaluation of  Back pain     The pt describes the symptoms as having  started in 2016 when digging up [place]  (after a move).     Pt describes pain in the back (lateral to the midline below the crest on the right ) .   Also has bilateral trochanteric bursitis.  Back  Pain is worse with standing more than 10 minutes or 30 minutes of walking   and better with sitting down or laying down.  Sleeps on her stomach.    Leaning on a shopping cart allows her to go further.   No PT, no injections (had injections years ago in her neck)  Legs feel weak, no numbness, bladder working ok.          Had a bad fall (backwards over furniture) went to [facility]. (3 surgeries on her left hip and 1 surgery on the Right hip)       Numbness and tingling on hands (positional)  and feet, comes and goes.        HPI:   Constipation    Past Medical History:   Diagnosis Date     Anxiety      Depression      Hyperlipidemia      Past Surgical History:   Procedure Laterality Date     HIP ARTHROPLASTY Bilateral          REVIEW OF SYSTEMS:  ROS reviewed with pt as documented on pt health form of 4/29/2019.    Negative cardiac, pulmonary, hematological     .  No family hx of anesthetic reactions  .  Grandfather with hx of phlebitis other than that no family hx of hypercoagulability .       MEDICATIONS:  Current Outpatient Medications   Medication Sig Dispense Refill     biotin 2,500 mcg Tab Take 5,000 mcg by mouth.       clonazePAM (KLONOPIN) 0.5 MG tablet Take 0.5 mg by mouth.       DULoxetine (CYMBALTA) 30 MG capsule Take 60 mg by mouth.       gabapentin (NEURONTIN) 300 MG capsule 3 tablets by mouth at bedtime.       meloxicam (MOBIC) 7.5 MG tablet Take 1-2 tablets (7.5-15 mg total) by mouth daily as needed for pain. 60 tablet 1     omega-3 fatty acids-vitamin E (FISH OIL) 1,000 mg cap Take 1  capsule by mouth.       traZODone (DESYREL) 50 MG tablet Take by mouth.       valACYclovir (VALTREX) 500 MG tablet Take 500 mg by mouth.       No current facility-administered medications for this visit.      Also wellbutrin    ALLERGIES/SENSITIVITIES:     Allergies   Allergen Reactions     Fentanyl Nausea Only     Prednisolone Other (See Comments)     Mood changes, trouble sleeping.       PERTINENT SOCIAL HISTORY:   Smoker one year (quit age 31)  Social History     Socioeconomic History     Marital status:      Spouse name: Not on file     Number of children: Not on file     Years of education: Not on file     Highest education level: Not on file   Occupational History     Not on file   Social Needs     Financial resource strain: Not on file     Food insecurity:     Worry: Not on file     Inability: Not on file     Transportation needs:     Medical: Not on file     Non-medical: Not on file   Tobacco Use     Smoking status: Former Smoker   Substance and Sexual Activity     Alcohol use: No     Drug use: No     Sexual activity: Not on file   Lifestyle     Physical activity:     Days per week: Not on file     Minutes per session: Not on file     Stress: Not on file   Relationships     Social connections:     Talks on phone: Not on file     Gets together: Not on file     Attends Caodaism service: Not on file     Active member of club or organization: Not on file     Attends meetings of clubs or organizations: Not on file     Relationship status: Not on file     Intimate partner violence:     Fear of current or ex partner: Not on file     Emotionally abused: Not on file     Physically abused: Not on file     Forced sexual activity: Not on file   Other Topics Concern     Not on file   Social History Narrative     Not on file         FAMILY HISTORY: mom  lung CA 58;  Dad  69 lung CA   Family History   Problem Relation Age of Onset     Cancer Mother      Cancer Father         PHYSICAL EXAM:   Constitutional:  There were no vitals taken for this visit.     Mental Status: A & O in no acute distress.  Affect is appropriate.  Speech is fluent.  Recent and remote memory are intact.  Attention span and concentration are normal.     Cranial Nerves: CN1: grossly intact per patient recall. CN2: No funduscopic exam performed. CN3,4 & 6: Pupillary light response, lateral and vertical gaze normal.  No nystagmus.  Visual fields are full to confrontation. CN5: Intact to touch CN7: No facial weakness, smile, facial symmetry intact. CN8: Intact to spoken voice. CN9&10: Gag reflex, uvula midline, palate rises with phonation. CN11: Shoulder shrug 5/5 intact bilaterally. CN12: Tongue midline and moves freely from side to side.     Motor: No pronator drift of upper extremity. Normal bulk and tone all muscle groups of upper and lower extremities. 5th digit weak abuduction     Sensory: Sensation intact bilaterally to light touch.      Coordination:  Heel/toe/  gait intact.    tandem gait      Reflexes; supinator, biceps, triceps, knee/ ankle jerk intact. No  hoffmans/ no     babinski/ clonus.    IMAGING: I personally reviewed all radiographic images    MRI  5 mm spondylolisthesis at L45 with and also at L5S1 with possible healed pars defects at L5     Flex/ext  No dynamic instability;  Few mm retrolisthesis of L4 on L5       CONSULTATION ASSESSMENT AND PLAN:  Pt has two issues;  One she has a very focal point tender back pain which I believe is in the region of the L45 or L5S1 facet on the right.  This is probably from the spondy which looks stable on dynamic views and the endplate are pretty good with maintained disk height.  While we could fuse her, another choice would be to restrengthen the core with PT  And intermittently use a support as needed (corset) not so much that muscles weaken.      If this doesn't help she will call for us to order a CT bone scan with SPECT to determine whether L45 or L5S1 on the right is a better candidate for  injection (facet), as a precurser for potential stabilization.    She also has bilateral trochanteric bursitis for which she will see buttock strengthening program with PT.       I spent more than 45 minutes in this apt, examining the pt, reviewing the scans, reviewing notes from chart, discussing treatment options with risks and benefits and coordinating care. >50 % clinic time was spent in face to face counseling and coordinating care    Jessica Collins       Cc:   Andrew Guo MD  [id number] [facility] [id number]

## 2021-05-29 NOTE — TELEPHONE ENCOUNTER
Spoke with pt and let her know we will review with Dr. Collins when she is in clinic on 6-12-19 and get back to her then.  She verbalized agreement of plan.   Bambi Romero RN

## 2021-05-29 NOTE — PROGRESS NOTES
"[facility] Daily Progress     Patient Name: Leda Verde  Date: 5/28/2019  Visit #: 7  PTA visit #: 1  Referral Diagnosis: spondylolisthesis of lumbar region  Referring provider: Damien Collins*  Visit Diagnosis:     ICD-10-CM    1. Spondylolisthesis of lumbar region M43.16    2. Generalized muscle weakness M62.81    3. Unsteadiness on feet R26.81        Past Medical History:   Diagnosis Date     Anxiety      Depression      Hyperlipidemia          Assessment:   7' late to appt     Patient is doing well and tolerated exercises in therapy well without difficulty today. She is appropriate for discharge next session.    HEP/POC compliance is  fair .  Patient demonstrates understanding/independence with home program.  Patient is benefitting from skilled physical therapy and is making steady progress toward functional goals.  Patient is appropriate to continue with skilled physical therapy intervention, as indicated by initial plan of care.    Goal Status(progressing towards):  Pt. will demonstrate/verbalize independence in self-management of condition in : 12 weeks;Progressing toward  Pt. will be independent with home exercise program in : 12 weeks;Met  Pt. will have improved quality of sleep: with less pain;waking less times/night;in 6 weeks;Met  Pt. will be able to walk : 30 minutes;with less difficulty;with less pain;for household mobility;in 12 weeks;for community mobility;for exercise/recreation;Met  Patient will stand : 30 minutes;with less pain;with less difficultty;in 6 weeks;for home chores;Met    Patient will decrease : CARMEN score;by _ points;for improved quality of function;for improved quality of life;in 12 weeks;Met  by ___ points: 6      Plan / Patient Education:     Continue with initial plan of care.  Progress with home program as tolerated.    Exercises:  Exercise #1: SKTC and piriformis stretch 30\" holds x3 B  Comment #1: glut squeeze x10 with 5\" holds  Exercise #2: abd set " "+march  Comment #2: cladeepak 5\" holds until fatigue B w/ L2  Exercise #3: sit to stands until fatigue  Comment #3: tandem 1' B  Exercise #4: bridge w/ hip add using ball x20-30    NO NU' STEP PER PATIENT REQUEST- IT BOTHERS THE DISC IN HER NECK    Plan for next session: don't give more exercises for HEP. Bike, D/C.  static/dynamic balance.     Subjective:     Pain Rating:     Isn't sure she's 85% better anymore. Bursitis is more tolerable. Her back will always hurt. Balance is better. Not doing abd set+marching exercise at home.      Objective:     Gait: Pt with no AD today. No unsteadiness  Transfers: normal  Modified Oswestry Low Back Pain Disablity Questionnaire  in %: 52      Treatment Today     TREATMENT MINUTES COMMENTS   Evaluation     Self-care/ Home management     Manual therapy     Neuromuscular Re-education     Therapeutic Activity     Therapeutic Exercises 17 Discussed progress and patient goals. Exercises performed in clinic but not added to HEP: abd set+single leg ext, abd set+pilates arch     Gait training     Modality__________________                Total 17    Blank areas are intentional and mean the treatment did not include these items.       Pramod Laurent, PT, DPT  5/28/2019  "

## 2021-05-29 NOTE — PROGRESS NOTES
"[facility] Daily Progress     Patient Name: Leda Verde  Date: 5/24/2019  Visit #: 6  PTA visit #: 1  Referral Diagnosis: spondylolisthesis of lumbar region  Referring provider: Damien Collins*  Visit Diagnosis:     ICD-10-CM    1. Spondylolisthesis of lumbar region M43.16    2. Generalized muscle weakness M62.81    3. Unsteadiness on feet R26.81        Past Medical History:   Diagnosis Date     Anxiety      Depression      Hyperlipidemia          Assessment:   Patient feels 85% better since beginning therapy. She appears stable without use of an AD. Will look into discharge from therapy next week.    HEP/POC compliance is  good .  Patient demonstrates understanding/independence with home program.  Patient is benefitting from skilled physical therapy and is making steady progress toward functional goals.  Patient is appropriate to continue with skilled physical therapy intervention, as indicated by initial plan of care.    Goal Status(progressing towards):  Pt. will demonstrate/verbalize independence in self-management of condition in : 12 weeks  Pt. will be independent with home exercise program in : 12 weeks  Pt. will have improved quality of sleep: with less pain;waking less times/night;in 6 weeks  Pt. will be able to walk : 30 minutes;with less difficulty;with less pain;for household mobility;in 12 weeks;for community mobility;for exercise/recreation  Patient will stand : 30 minutes;with less pain;with less difficultty;in 6 weeks;for home chores    Patient will decrease : CARMEN score;by _ points;for improved quality of function;for improved quality of life;in 12 weeks  by ___ points: 6      Plan / Patient Education:   Pt did well with all exercises and balance drills.  Continue with initial plan of care.  Progress with home program as tolerated.    Exercises:  Exercise #1: SKTC and piriformis stretch 30\" holds x3 B  Comment #1: glut squeeze x10 with 5\" holds  Exercise #2: abd set " "+march  Comment #2: zakia 5\" holds until fatigue B w/ L2  Exercise #3: sit to stands until fatigue  Comment #3: tandem 1' B  Exercise #4: bridge w/ hip add using ball x20-30    NO NU' STEP PER PATIENT REQUEST- IT BOTHERS THE DISC IN HER NECK    Plan for next session: don't give more exercises for HEP. static/dynamic balance. Monster walk w/ band.     Subjective:     Pain Rating:     Going to get injections for her hip bursitis \"it's always going to be there\".  Feels 85% better since better since last session.        Objective:     Gait: Pt with no AD today. No unsteadiness  No LOB with any exercise today    Treatment Today     TREATMENT MINUTES COMMENTS   Evaluation     Self-care/ Home management     Manual therapy     Neuromuscular Re-education     Therapeutic Activity     Therapeutic Exercises 30 Recumbant bike WL 3.0 x5'. Discussed progress. Exercises performed in clinic but not added to HEP: step ups to the front and side w/o HH on 5\" step x15 B, 50 squats on total gym, monster walks until fatigue B w/o band, 20 heel raises, side lunges w/o HH x15 B     Gait training     Modality__________________                Total 30    Blank areas are intentional and mean the treatment did not include these items.       Pramod Carreno, PT, DPT  5/24/2019  "

## 2021-05-29 NOTE — TELEPHONE ENCOUNTER
Per Dr. Dennis reynaga to order bilateral bursa injections and to order CT scan with spect.. Pt agreed with plan.   ERIC Gallegos

## 2021-05-29 NOTE — PROGRESS NOTES
"[facility] Daily Progress/discharge summary     Patient Name: Leda Verde  Date: 5/31/2019  Visit #: 8  PTA visit #: 1  Referral Diagnosis: spondylolisthesis of lumbar region  Referring provider: Damien Collins*  Visit Diagnosis:     ICD-10-CM    1. Spondylolisthesis of lumbar region M43.16    2. Generalized muscle weakness M62.81    3. Unsteadiness on feet R26.81        Past Medical History:   Diagnosis Date     Anxiety      Depression      Hyperlipidemia          Assessment:   Patient reports feeling better overall since beginning therapy but does continue to have pain. She demonstrates significant improvements with her balance. Patient has met her goals and is appropriate for discharge today. She will need a new order to resume in the future. Patient is agreeable to this plan.    HEP/POC compliance is  fair .  Patient demonstrates understanding/independence with home program.      Goal Status:  Pt. will demonstrate/verbalize independence in self-management of condition in : 12 weeks;Met  Pt. will be independent with home exercise program in : 12 weeks;Met  Pt. will have improved quality of sleep: with less pain;waking less times/night;in 6 weeks;Met  Pt. will be able to walk : 30 minutes;with less difficulty;with less pain;for household mobility;in 12 weeks;for community mobility;for exercise/recreation;Met  Patient will stand : 30 minutes;with less pain;with less difficultty;in 6 weeks;for home chores;Met    Patient will decrease : CARMEN score;by _ points;for improved quality of function;for improved quality of life;in 12 weeks;Met  by ___ points: 6      Plan / Patient Education:     Continue with initial plan of care.  Progress with home program as tolerated.    Exercises:  Exercise #1: SKTC and piriformis stretch 30\" holds x3 B  Comment #1: glut squeeze x10 with 5\" holds  Exercise #2: abd set +march  Comment #2: clamshell 5\" holds until fatigue B w/ L2  Exercise #3: sit to stands until " fatigue  Comment #3: tandem 1' B  Exercise #4: bridge w/ hip add using ball x20-30    NO NU' STEP PER PATIENT REQUEST- IT BOTHERS THE DISC IN HER NECK      Subjective:     Pain Rating:     Isn't sure she's 85% better anymore. Bursitis is more tolerable. Her back will always hurt. Balance is better. Not doing abd set+marching exercise at home.      Objective:     Gait: Pt with no AD today. No unsteadiness noted  Transfers: normal  Modified Oswestry Low Back Pain Disablity Questionnaire  in %: 52      Treatment Today     TREATMENT MINUTES COMMENTS   Evaluation     Self-care/ Home management     Manual therapy     Neuromuscular Re-education 10 On foam: 1/2 tandem B, rhomberg 1' each, stepping onto/off of foam x10-15 B   Therapeutic Activity     Therapeutic Exercises 15 Discussed progress and patient goals. Exercises performed in clinic but not added to HEP: single leg and double leg press on total gym. recumbant bike 5' L4.     Gait training     Modality__________________                Total 25    Blank areas are intentional and mean the treatment did not include these items.       Pramod Laurent, PT, DPT  5/31/2019

## 2021-05-29 NOTE — TELEPHONE ENCOUNTER
Patient calling stating that she went to physical therapy and it helped a little but she is now wanting to try injections. She would like an injection for her back and the bursitis in her hip. She did make an appointment with Dr. Arias at [facility] for this friday because she didn't know if we would be willing or able to order the injections. She would like to know if she should keep this appointment. Please call her back at [phone]

## 2021-05-30 NOTE — TELEPHONE ENCOUNTER
Patient calling for results if NM Bone scan. We have not received them from [facility]. I requested they be sent to our office today. Please call patient with next steps as injections did not help either.

## 2021-05-30 NOTE — TELEPHONE ENCOUNTER
Called and informed Leda that Dr. Collins reviewed her bone scan and ordered a Right L4-5 facet injection due to marked uptake right L4-5 facet. Leda verbalized agreement.  Susannah Franco RN, CNRN

## 2021-05-31 NOTE — PATIENT INSTRUCTIONS - HE
Follow-up visit to discuss injection outcome and determine care plan going forward.       DISCHARGE INSTRUCTIONS    During office hours (8:00 a.m.- 4:30 p.m.) questions or concerns may be answered  by calling Spine Navigation Nurses at  [phone].     If you experience any problems after hours  please call [phone] and you will be connected to [facility].     All Patients:    ? You may experience an increase in your symptoms for the first 2 days (It may take anywhere between 2 days- 2 weeks for the steroid to have maximum effect).    ? You may use ice on the injection site, as frequently as 20 minutes each hour if needed.    ? You may take your pain medicine.    ? You may continue taking your regular medication after your injection. If you have had a Medial Branch Block you may resume pain medication once your pain diary is completed.    ? You may shower. No swimming, tub bath or hot tub for 48 hours.  You may remove your bandaid/bandage as soon as you are home.    ? You may resume light activities, as tolerated.    ? Resume your usual diet as tolerated.    ? It is strongly advised that you do not drive for 1-3 hours post injection.    ? If you have had oral sedation:  Do not drive for 8 hours post injection.      ? If you have had IV sedation:  Do not drive for 24 hours post injection.  Do not operate hazardous machinery or make important personal/business decisions for 24 hours.      POSSIBLE STEROID SIDE EFFECTS (If steroid/cortisone was used for your procedure)    -If you experience these symptoms, it should only last for a short period      Swelling of the legs                Skin redness (flushing)       Mouth (oral) irritation     Blood sugar (glucose) levels              Sweats                      Mood changes    Headache    Sleeplessness         POSSIBLE PROCEDURE SIDE EFFECTS  -Call the Spine Center if you are concerned    Increased Pain             Increased numbness/tingling         Nausea/Vomiting            Bruising/bleeding at site        Redness or swelling                                                Difficulty walking        Weakness             Fever greater than 100.5    *In the event of a severe headache after an epidural steroid injection that is relieved by lying down, please call the [facility] to speak with a clinical staff member*

## 2021-05-31 NOTE — TELEPHONE ENCOUNTER
Patient is calling to update the care team about the results of her injection. Please call Leda call @ [phone]. Okay to leave a message

## 2021-05-31 NOTE — TELEPHONE ENCOUNTER
Kar reports some improvement of her symptoms since had a Right L4-5 facet injection. Will keep us posted.  Susannah Franco RN, CNRN

## 2021-06-02 ENCOUNTER — RECORDS - HEALTHEAST (OUTPATIENT)
Dept: ADMINISTRATIVE | Facility: CLINIC | Age: 61
End: 2021-06-02

## 2021-06-02 VITALS — BODY MASS INDEX: 28.17 KG/M2 | WEIGHT: 165 LBS | HEIGHT: 64 IN

## 2021-06-02 NOTE — TELEPHONE ENCOUNTER
Patient calling requesting to have another injection done. Having pain in her lumbar area and pain down right leg.  Please call patient to discuss or place order [phone]

## 2021-06-03 VITALS — HEIGHT: 64 IN | BODY MASS INDEX: 27.66 KG/M2 | WEIGHT: 162 LBS

## 2021-06-03 NOTE — TELEPHONE ENCOUNTER
Patient calling as she has been placed on antibiotics for an infection. She is on a 10 day course through 11/14 and then has a follow up with her provider on 11/21. She would like to reschedule after being seen by that provider. Transferred to scheduling to reschedule the injection appointment.

## 2021-06-03 NOTE — PATIENT INSTRUCTIONS - HE
Follow-up visit with Dr. Schroeder in 2-4 weeks to discuss injection outcome and determine care plan going forward.       DISCHARGE INSTRUCTIONS    During office hours (8:00 a.m.- 4:00 p.m.) questions or concerns may be answered  by calling Spine Center Navigation Nurses at  [phone].  Messages received after hours will be returned the following business day.      In the case of an emergency, please dial 911 or seek assistance at the nearest Emergency Room/Urgent Care facility.     All Patients:    ? You may experience an increase in your symptoms for the first 2 days (It may take anywhere between 2 days- 2 weeks for the steroid to have maximum effect).    ? You may use ice on the injection site, as frequently as 20 minutes each hour if needed.    ? You may take your pain medicine.    ? You may continue taking your regular medication after your injection. If you have had a Medial Branch Block you may resume pain medication once your pain diary is completed.    ? You may shower. No swimming, tub bath or hot tub for 48 hours.  You may remove your bandaid/bandage as soon as you are home.    ? You may resume light activities, as tolerated.    ? Resume your usual diet as tolerated.    ? It is strongly advised that you do not drive for 1-3 hours post injection.    ? If you have had oral sedation:  Do not drive for 8 hours post injection.      ? If you have had IV sedation:  Do not drive for 24 hours post injection.  Do not operate hazardous machinery or make important personal/business decisions for 24 hours.      POSSIBLE STEROID SIDE EFFECTS (If steroid/cortisone was used for your procedure)    -If you experience these symptoms, it should only last for a short period      Swelling of the legs                Skin redness (flushing)       Mouth (oral) irritation     Blood sugar (glucose) levels              Sweats                      Mood changes    Headache    Sleeplessness         POSSIBLE PROCEDURE SIDE  EFFECTS  -Call the Spine Center if you are concerned    Increased Pain             Increased numbness/tingling        Nausea/Vomiting            Bruising/bleeding at site        Redness or swelling                                                Difficulty walking        Weakness             Fever greater than 100.5    *In the event of a severe headache after an epidural steroid injection that is relieved by lying down, please call the [facility] to speak with a clinical staff member*

## 2021-06-04 VITALS
HEART RATE: 84 BPM | HEIGHT: 64 IN | BODY MASS INDEX: 28.51 KG/M2 | SYSTOLIC BLOOD PRESSURE: 107 MMHG | OXYGEN SATURATION: 96 % | RESPIRATION RATE: 16 BRPM | DIASTOLIC BLOOD PRESSURE: 78 MMHG | WEIGHT: 167 LBS

## 2021-06-04 VITALS — HEIGHT: 64 IN | WEIGHT: 162 LBS | BODY MASS INDEX: 27.66 KG/M2

## 2021-06-04 NOTE — PROGRESS NOTES
Assessment/Plan:      Diagnoses and all orders for this visit:    Lumbar spine pain  -     OPS Medial Branch Block Bilateral; Future; Expected date: 12/13/2019    Arthropathy of lumbar facet joint  -     OPS Medial Branch Block Bilateral; Future; Expected date: 12/13/2019    Spondylolisthesis of lumbar region  -     OPS Medial Branch Block Bilateral; Future; Expected date: 12/13/2019    Myofascial pain        Assessment: Pleasant 59 y.o. female with a history of anxiety depression, hyperlipidemia, GI difficulties with:    1.  Chronic lumbar spine pain lumbosacral junction down the back of the legs to the knees.  She has significant facet arthropathy L4-5 and L5-S1 with mild spondylolisthesis at those levels on MRI likely resulting in her low back pain.    2.  She does have moderate foraminal stenosis bilaterally L4-5 which may contribute to the lower extremity symptoms    3.  Myofascial pain lumbar spine gluteal region and greater trochanters.      Discussion:    1. I discussed the diagnosis and treatment options.  We discussed that she had initial very good improvement with facet injections with the symptoms returned.  We discussed options of medial branch blocks.  She has failed physical therapy along with facet injections.    2.  Recommend bilateral L3, 4, 5 medial branch blocks with progression to RF if indicated.    3.  Continue her home exercises from physical therapy.    4.  Follow-up after RFA if indicated.      It was our pleasure caring for your patient today, if there any questions or concerns please do not hesitate to contact us.      Subjective:   Patient ID: Leda Verde is a 59 y.o. female.    History of Present Illness: Patient presents at the request Dr. Collins for evaluation of low back pain.  Was last seen in clinic 3 years ago but also has had injections recently in clinic.  The patient has a longstanding history of low back pain lumbosacral junction along with pain down the back of  the legs to the knees no paresthesias.  This is worsened with time.  Pain is a 7/10 at worst 3/10 today for 2/10 at best.  Worse with activities such as walking.  Better with laying down.  She describes this pain is a dull with occasional sharp pains.    She was recently seen by Dr. Collins several months ago.  Subsequently had bilateral greater trochanteric bursa injections at [facility] followed by right L4-5 facet injections by Dr. Moreira in July and November 2019.  After the injection she tells me she did very well for short time and then the pain has worsened and now being felt more in the low back without any leg pain typical day.  She has completed physical therapy as well.*This pain is now become bilateral      Imaging: MRI report and images were personally reviewed and discussed with the patient.  A plastic model was utilized during the discussion.  MRI of the lumbar spine personally reviewed.  This shows relatively normal disc heights throughout the lumbar spine with exception of mild to moderate changes at L5-S1 with disc height loss.  L4-5 she has moderate foraminal stenosis bilaterally with significant facet arthropathy right greater than left as well as significant facet arthropathy at L5-S1 right greater than left.    Bone scan with CT SPECT shows significant uptake right L4-5 facet    Review of Systems: Denies chest pain shortness of breath eye pain.  She does have headaches.  She has some GI issues/abdominal pain left upper quadrant intermittently seeing GI.  No numbness tingling weakness in the legs.  No bowel or bladder incontinence.  No falls difficulty swallowing coordination skills fevers or weight loss.    Prior interventions:  1. Bilateral greater trochanteric bursa injections [facility] June/July 2019  2.  Right L4-5 facet joint injection July 2019 and November 2019 spine center  3 physical therapy.  [facility] rehab completed 8 visits ending May 31, 2019    Past Medical History:    Diagnosis Date     Anxiety      Depression      Hyperlipidemia        The following portions of the patient's history were reviewed and updated as appropriate: allergies, current medications, past family history, past medical history, past social history, past surgical history and problem list.           Objective:   Physical Exam:    Vitals:    12/13/19 1124   BP: 122/67   Pulse: 83     Body mass index is 27.81 kg/m .      General: Alert and oriented with normal affect. Attention, knowledge, memory, and language are intact. No acute distress.   Eyes: Sclerae are clear.  Respirations: Unlabored. CV: No lower extremity edema, no cervical lymphadenopathy palpated  .  Skin: Small rash midline cervical spine at the hairline posteriorly  Gait:  Nonantalgic  Able to heel toe walk without difficulty.  Negative Romberg.  Tenderness palpation bilateral greater trochanters and L4-5 L5-S1 level lumbar spine bilaterally.  Sensation is intact to light touch throughout the upper and lower extremities.  Reflexes are 2+ and symmetric in the biceps triceps and brachioradialis with negative Hoffmans. 2+ patellar and Achilles with downgoing toes.  Negative seated straight leg raise bilaterally.  Manual muscle testing reveals:  Right /Left out of 5     5/5 finger flexors  5/5 hip flexors  5/5 knee flexors  5/5 knee extensors  5/5 ankle plantar flexors  5/5 ankle dorsiflexors  5/5  EHL

## 2021-06-04 NOTE — PATIENT INSTRUCTIONS - HE
Medial Branch Block (MBB) TEST    This is a TEST injection to find out what is causing your pain.  Specifically, this test is trying to identify which joint in your back is causing pain.   This injection will NOT provide any long term pain relief because it is just a TEST.  Steroid is NOT used for this injection.   Steroid is what gives long term pain relief.  Since there is no steroid used, there is no long term pain relief.    Because we want to determine what is causing your pain, we need you to do a few things on the day of your Medial Branch Block TEST:    1.  Do not take any pain medications on the day of your Medial Branch Block/Test.  2. Try to do activities that make our pain increase.  We want you to have a high level of pain on the day of the test.  This makes it easier to know the results of the test.  Other information:    - You may eat and drink on the day of the test.  - You should take your other medications (just not pain medications) at the times you usually take them  - You will be asked to fill out a pain diary for 6 hours after the test.    AFTER THE TEST:    1. Please do not take any pain medications for 6 hours after the TEST.  After the pain diary is filled out, you may resume taking your pain medications.  2. Please return the pain diary to the Spine Center the next day or as soon as you are able.  3. You will be contacted with what will happen next after the physician reviews your pain diary.  a. You may be asked to come in for a follow-up appointment to discuss other treatment options  b. It may be recommended that you have an injection to help treat your pain.  c. You may need to come in for a second set of Medial Branch Blocks (TESTS).        Please call the spine center at [phone] if you have any questions.

## 2021-06-05 NOTE — PROGRESS NOTES
Leda Verde is here today for a Bilateral L3,L4,L5 Medial branch block injections with Dr Moreira.  The patient is not able to have the injection today because pain level was not high enough.  The patient will be rescheduled when pain is at or above 5/10. Discussed in depth the Radiofrequency nerve ablation process and the purpose of the MBB and why it is important to have a pain level of at least 5/10. Patient reports having several things going on which is at the moment distracting and she forgot that she should have held off on her Wellbutrin dose earlier today. Patient was given the information for Care Navigation and encouraged to call when her pain levels were high enough, or with any questions or concerns, and that the we would try to be accommodating as possible depending on Dr Moreira's schedule. Patient verbalized understanding.      No charge for today s visit.

## 2021-06-05 NOTE — PATIENT INSTRUCTIONS - HE
Please complete your pain diary and return the diary to the Spine Center at your earliest convenience.  The Spine Center will contact you to schedule your next appointment after your pain diary is reviewed by your doctor.  Thank you.    DISCHARGE INSTRUCTIONS    During office hours (8:00 a.m.- 4:00 p.m.) questions or concerns may be answered  by calling Spine Center Navigation Nurses at  [phone].  Messages received after hours will be returned the following business day.      In the case of an emergency, please dial 911 or seek assistance at the nearest Emergency Room/Urgent Care facility.     All Patients:  ? You may experience an increase in your symptoms for the first 2 days, once the numbing medication wears off.    ? You may resume your regular medication, no pain medication until you have completed your diary    ? You may shower. No swimming, tub bath or hot tub for 24 hours; remove bandage after 4 hours    ? Continue your activities that can cause you pain to test the blocks.                         ? You should not drive for the next 3 to 5 hours (have someone drive you)           POSSIBLE PROCEDURE SIDE EFFECTS  -Call Spine Center if concerned-    Increased Pain  Increased numbness/tingling     Nausea/Vomiting  Bruising/bleeding at site (hematoma)             Swelling at site (edema) Headache  Difficulty walking  Infection        Fever greater than 100.5

## 2021-06-07 NOTE — PROGRESS NOTES
"Leda Verde is a 60 y.o. female who is being evaluated via a billable telephone visit.      The patient has been notified of following:     \"This telephone visit will be conducted via a call between you and your physician/provider. We have found that certain health care needs can be provided without the need for a physical exam.  This service lets us provide the care you need with a short phone conversation.  If a prescription is necessary we can send it directly to your pharmacy.    If during the course of the call the physician/provider feels a telephone visit is not appropriate, you will not be charged for this service.\"     Due to the COVID 19 crisis, this visit was done over the telephone with a telemedicine visit as opposed to face-to-face.  This was done to limit exposure/risk for the patient and provider.    Patient has given verbal consent to a Telephone visit??Yes          Leda Verde complains of    Chief Complaint   Patient presents with     Back Pain       Patient presents today via telemedicine visit for evaluation of low back pain.  She was last seen in December.  She was scheduled for medial branch blocks bilateral L3-4-5 and this was canceled as she had taken ibuprofen before the procedure and did not have sufficient pain to proceed.  Over the past few months her pain has slowly worsened.  Her pain is mostly in the low back/lumbar spine with some pain into the right gluteal region and she has had a couple episodes of \"sciatica \"down the right leg mostly to the knee.  Her pain today is a 6/10.  She has difficult time even walking a block.  Takes numerous medications including gabapentin at bedtime and in the morning and Cymbalta.  She describes some numbness over the top of the ankles intermittently but also feels she may have some swelling.    In the past has had facet injections which significantly helped her low back pain but again the medial branch blocks that were scheduled or " canceled as her pain was not sufficient at that time.  She did have a greater trochanteric bursa injections at [facility] but today she is not complaining of pain out laterally into the hips only in the low back and occasionally down the back of the legs.    Imaging: Personally reviewed MRI images revealing normal disc height in the lumbar spine exception of mild to moderate disc height loss L5-S1 facet arthropathy L4-5 with moderate foraminal stenosis right slightly worse than left.  Is also significant facet arthropathy L5-S1 right worse than left.      ALLERGIES  Fentanyl and Prednisolone    Review of systems:  Complains of some numbness and tingling in the feet weakness in the legs.  No bowel or bladder incontinence.  She does have headaches.  No falls, difficulty swallowing, fevers or weight loss.  She has some coordination problems in the hands that she attributes to arthritis.    I have reviewed and updated the patient's Past Medical History, Social History, Family History and Medication List.      Assessment:  Diagnoses and all orders for this visit:    Lumbar spine pain  -     methocarbamoL (ROBAXIN) 500 MG tablet; Take 1 tablet (500 mg total) by mouth 3 (three) times a day as needed.  Dispense: 30 tablet; Refill: 0    Arthropathy of lumbar facet joint  -     methocarbamoL (ROBAXIN) 500 MG tablet; Take 1 tablet (500 mg total) by mouth 3 (three) times a day as needed.  Dispense: 30 tablet; Refill: 0    Myofascial pain  -     methocarbamoL (ROBAXIN) 500 MG tablet; Take 1 tablet (500 mg total) by mouth 3 (three) times a day as needed.  Dispense: 30 tablet; Refill: 0    Spondylolisthesis of lumbar region        Pleasant 60 y.o. female with a history of anxiety depression, hyperlipidemia, GI difficulties with:       1.   Persistent and worsening chronic lumbar spine pain lumbosacral junction down the back of the legs to the knees.  She has significant facet arthropathy L4-5 and L5-S1 with mild spondylolisthesis at  those levels on MRI likely resulting in her low back pain.     2.  She does have moderate foraminal stenosis bilaterally L4-5 which may contribute to the lower extremity symptoms     3.  Myofascial pain lumbar spine gluteal region and greater trochanters.    PLAN:    1.  Her pain is now worsened over the past couple of months.  She is having mostly back pain but also some the gluteal region.  We discussed that the facet arthropathy is likely causing her pain she is having minimal greater trochanter pain now per her description.  We discussed medication options and injections.    2.  Trial methocarbamol as needed for myofascial pain.    3.  We will  contact her to schedule the medial branch blocks that were previously Canceled.  I believe continuing with the plan of the bilateral L3, 4, 5 medial branch blocks would be beneficial to answer the question if it is the facets causing the pain or foraminal stenosis.    4.  She will follow-up with me 2 to 4 weeks after medial branch blocks if they failed initially or may proceed with RF if indicated.      Call start: 1346  Call completed: 1358    Phone call duration: 12 minutes    Leonidas Schroeder DO

## 2021-06-07 NOTE — TELEPHONE ENCOUNTER
Patient said she would like flexeril 5 mg instead.  I'm not sure if her insurance covers that but she want to give it a try to see if it's covered.

## 2021-06-07 NOTE — PATIENT INSTRUCTIONS - HE
1. We will contact you to schedule the medial branch blocks    2. Try methocarbamol as needed for muscle pain

## 2021-06-07 NOTE — TELEPHONE ENCOUNTER
PSP:  Dr. Schroeder  Last clinic visit:  LOV 12/13/2019; Scheduled for inj (Bilateral L3, L4, L5 MBBs) as work up towards RF on 1/7/2020, however pt was not at 5/10 for pain so did not have her inj that day.   Reason for call: Increased pain, inquiring about moving forward with inj  Clinical information: Pt reporting her pain has increased to 5/10 at this time. She endorses right greater than left low back pain, radiating down bilateral legs to knee. She states her pain is in the same location as it has always been, except it has gradually intensified over time. Pt inquiring about next steps moving forward. She wonders about proceeding with ordered injection at this time. Due to COVID-19, injections being scheduled out to May 18th.   Advice given to patient: Offered pt telephone visit with Dr. Schroeder when he returns to clinic to discuss scheduling MBBs once able to do so and to discuss management in the meantime. Pt agreeable to this plan. Transferred pt to scheduling to make appt.

## 2021-06-07 NOTE — TELEPHONE ENCOUNTER
If insurance recommends tizanidine on their formulary.  Please contact the patient and inform her that I will prescribe a trial of tizanidine instead of methocarbamol if she agrees.

## 2021-06-09 NOTE — PATIENT INSTRUCTIONS - HE
Please complete your pain diary and return the diary to the Spine Center at your earliest convenience.  The Spine Center will contact you to schedule your next appointment after your pain diary is reviewed by your doctor.  Thank you.    DISCHARGE INSTRUCTIONS    During office hours (8:00 a.m.- 4:00 p.m.) questions or concerns may be answered  by calling Spine Center Navigation Nurses at  [phone].  Messages received after hours will be returned the following business day.      In the case of an emergency, please dial 911 or seek assistance at the nearest Emergency Room/Urgent Care facility.     All Patients:  ? You may experience an increase in your symptoms for the first 2 days, once the numbing medication wears off.    ? You may resume your regular medication, no pain medication until you have completed your diary    ? You may shower. No swimming, tub bath or hot tub for 24 hours; remove bandage after 4 hours    ? Continue your activities that can cause you pain to test the blocks.                         ? You should not drive for the next 3 to 5 hours (have someone drive you)           POSSIBLE PROCEDURE SIDE EFFECTS  -Call Spine Center if concerned-    Increased Pain  Increased numbness/tingling     Nausea/Vomiting  Bruising/bleeding at site (hematoma)             Swelling at site (edema) Headache  Difficulty walking  Infection        Fever greater than 100.5

## 2021-06-09 NOTE — PATIENT INSTRUCTIONS - HE
1. Try lumbar epidural for your right low back and gluteal pain    2. Try diclofenac gel for your hip/bursitis pain    3. Refill of cyclobenzaprine provided

## 2021-06-09 NOTE — PROGRESS NOTES
Assessment/Plan:      Diagnoses and all orders for this visit:    Lumbar radicular pain  -     OPS TFESI Lumbar Sacral Unilateral; Future; Expected date: 07/17/2020    Foraminal stenosis of lumbar region  -     OPS TFESI Lumbar Sacral Unilateral; Future; Expected date: 07/17/2020    Bilateral stenosis of lateral recess of lumbar spine    Arthropathy of lumbar facet joint    Myofascial pain  -     diclofenac sodium (VOLTAREN) 1 % Gel; Apply 2 g topically 2 (two) times a day as needed. Apply sparingly to affected area twice daily as needed for pain.  Dispense: 100 g; Refill: 1    Trochanteric bursitis of both hips  -     diclofenac sodium (VOLTAREN) 1 % Gel; Apply 2 g topically 2 (two) times a day as needed. Apply sparingly to affected area twice daily as needed for pain.  Dispense: 100 g; Refill: 1    Myofascial muscle pain  -     cyclobenzaprine (FLEXERIL) 5 MG tablet; Take 1 tablet (5 mg total) by mouth 3 (three) times a day as needed for muscle spasms.  Dispense: 30 tablet; Refill: 1    Spondylolisthesis of lumbar region        Assessment: Pleasant 60 y.o. female with a history of anxiety depression, hyperlipidemia, reflux with:       1.  Worsening low back pain and right gluteal pain right greater than left.  She has severe up down foraminal stenosis on the right and moderate on the left at L4-5 likely responsible for her symptoms.  There is also some lateral recess stenosis with potential contact of the L5 nerves.  The foraminal stenosis is related to significant facet arthropathy at L4-5 and L5-S1 with grade 1 degenerative spondylolisthesis at those levels.    2.  Myofascial pain gluteal region greater trochanters with potential trochanteric bursitis of both hips.      Discussion:    1.  We discussed the diagnosis and treatment options.  She is failed medial branch blocks with only approximately 50% to 60% improvement in the symptoms.  I question if radiculopathy is the cause for her pain related to the  foraminal stenosis.  She has failed several visits of physical therapy last visit was in May 2019 which increased her pain.  Pain is worsened over time.    2.  Recommend a right L4-5 transforaminal epidural steroid injection.  This can be done with Dr. Moreira.    3.  We will refill cyclobenzaprine to help with some of her myofascial pain.    4.  We will provide diclofenac gel for her to use over her greater trochanters.    5.  Can consider left L4-5 transforaminal epidural steroid injection based on the results on the right if needed.    6.  Follow-up via video visit 2 weeks after injection.    It was our pleasure caring for your patient today, if there any questions or concerns please do not hesitate to contact us.      Subjective:   Patient ID: Leda Verde is a 60 y.o. female.    History of Present Illness: *Patient presents for follow-up of low back pain and right greater than left gluteal pain.  Patient was last seen via telephone visit in April of this year.  She has had facet injections in the past for severe facet arthropathy and back pain which did help for a very short period of time.  Medial branch blocks were done bilaterally and she received only 50 to 60% benefit from those injections was still having significant back pain.    Her pain is worsened with time.  Over the past several days she is also developed severe right-sided stabbing pain with any standing and walking.  This is on the right side and radiates to the right gluteal region.  She is getting some left posterior thigh pain as well in the medial aspect of the posterior thigh.  Her back pain is worse with any walking going up stairs, bending to do the  or make the beds.  Better with ibuprofen.  Pain is 8/10 at worst 7/10 today 4/10 at best.    She has completed physical therapy in the past with the last visit in May 2019 although this caused her to have increased pain.      Imaging: MRI report and images were personally  reviewed and discussed with the patient.  A plastic model was utilized during the discussion.  MRI of theThis was from April 2019.  This shows severe facet arthropathy L4-5 and at least moderate L5-S1 with degenerative grade 1 spondylolisthesis at those levels.  Severe right and moderate left foraminal stenosis up down at L4-5 related to spondylolisthesis.  There is also lateral recess stenosis with potential contact of the L5 nerves.  Lumbar spine personally reviewed.    Review of Systems: Pertinent positives:  Complains of some generalized weakness in her low back.  She does have headaches stumbles coordination problems.  No numbness or tingling in her legs, bowel or bladder incontinence, swallowing difficulties, fevers or unintentional weight loss. All others reviewed are negative.      Prior interventions:  1.  Bilateral L4-5 facet injection did give temporary benefit.    2.  Medial branch blocks gave 50 to 60% improvement.    Past Medical History:   Diagnosis Date     Anxiety      Depression      Hyperlipidemia        The following portions of the patient's history were reviewed and updated as appropriate: allergies, current medications, past family history, past medical history, past social history, past surgical history and problem list.           Objective:   Physical Exam:    Vitals:    07/17/20 1126   BP: 108/59   Pulse: 87   Temp: 98.3  F (36.8  C)     Body mass index is 27.81 kg/m .      General: Alert and oriented with normal affect. Attention, knowledge, memory, and language are intact. No acute distress.   Eyes: Sclerae are clear.  Respirations: Unlabored. CV: No lower extremity edema.  Skin: No rashes seen.    Gait:  Nonantalgic.  Negative Romberg.  Mild-moderate decreased lumbar flexion and finger to floor testing.  Tenderness palpation bilateral gluteal tissues and greater trochanters.  Sensation is intact to light touch throughout the   lower extremities.  Reflexes are  2+ patellar and Achilles  with downgoing toes.    Manual muscle testing reveals:  Right /Left out of 5     5/5 hip flexors  5/5 knee flexors  5/5 knee extensors  5/5 ankle plantar flexors  5/5 ankle dorsiflexors  5/5  EHL

## 2021-06-10 NOTE — TELEPHONE ENCOUNTER
"Call to pt with provider's results and recommendations. \"No one is available to take your call. Thank you for calling\". Unable to leave voicemail.   "

## 2021-06-10 NOTE — TELEPHONE ENCOUNTER
She has had 2 therapeutic injections over the past month.  Please have her follow-up in person.  We can consider bursa injections at that appointment, but I would like to be cautious with the amount of steroid we are using over a short period of time.  The bursa injections are not out of question and we could potentially do them same day.

## 2021-06-10 NOTE — PATIENT INSTRUCTIONS - HE
Follow-up visit with Dr. Schroeder in 2-4 weeks to discuss injection outcome and determine care plan going forward.       DISCHARGE INSTRUCTIONS    During office hours (8:00 a.m.- 4:00 p.m.) questions or concerns may be answered  by calling [facility] Navigation Nurses at  [phone].  Messages received after hours will be returned the following business day.      In the case of an emergency, please dial 911 or seek assistance at the nearest Emergency Room/Urgent Care facility.     All Patients:    ? You may experience an increase in your symptoms for the first 2 days (It may take anywhere between 2 days- 2 weeks for the steroid to have maximum effect).    ? You may use ice on the injection site, as frequently as 20 minutes each hour if needed.    ? You may take your pain medicine.    ? You may continue taking your regular medication after your injection. If you have had a Medial Branch Block you may resume pain medication once your pain diary is completed.    ? You may shower. No swimming, tub bath or hot tub for 48 hours.  You may remove your bandaid/bandage as soon as you are home.    ? You may resume light activities, as tolerated.    ? Resume your usual diet as tolerated.    ? It is strongly advised that you do not drive for 1-3 hours post injection.    ? If you have had oral sedation:  Do not drive for 8 hours post injection.      ? If you have had IV sedation:  Do not drive for 24 hours post injection.  Do not operate hazardous machinery or make important personal/business decisions for 24 hours.      POSSIBLE STEROID SIDE EFFECTS (If steroid/cortisone was used for your procedure)    -If you experience these symptoms, it should only last for a short period      Swelling of the legs                Skin redness (flushing)       Mouth (oral) irritation     Blood sugar (glucose) levels              Sweats                      Mood changes    Headache    Sleeplessness         POSSIBLE PROCEDURE SIDE  EFFECTS  -Call the Spine Center if you are concerned    Increased Pain             Increased numbness/tingling        Nausea/Vomiting            Bruising/bleeding at site        Redness or swelling                                                Difficulty walking        Weakness             Fever greater than 100.5    *In the event of a severe headache after an epidural steroid injection that is relieved by lying down, please call the [facility] to speak with a clinical staff member*

## 2021-06-10 NOTE — TELEPHONE ENCOUNTER
Pt returned call. Results and recommendations given. Pt stated understanding. Pt had SI joint injection on 8/18/2020. She reports no relief of her symptoms at this point.     Pt inquiring about bilateral bursa injections. She states she has had these in the past at [facility] and this was discussed with Dr. cShroeder. She wonders if these could be scheduled.     Informed pt that provider will be notified of her request and she will be called back with a response.

## 2021-06-10 NOTE — PATIENT INSTRUCTIONS - HE
Please follow up two weeks post procedure with Dr Schroeder to evaluate your plan of care.       DISCHARGE INSTRUCTIONS    During office hours (8:00 a.m.- 4:00 p.m.) questions or concerns may be answered  by calling Sp[facility] Center Navigation Nurses at  [phone].  Messages received after hours will be returned the following business day.      In the case of an emergency, please dial 911 or seek assistance at the nearest Emergency Room/Urgent Care facility.     All Patients:    ? You may experience an increase in your symptoms for the first 2 days (It may take anywhere between 2 days- 2 weeks for the steroid to have maximum effect).    ? You may use ice on the injection site, as frequently as 20 minutes each hour if needed.    ? You may take your pain medicine.    ? You may continue taking your regular medication after your injection. If you have had a Medial Branch Block you may resume pain medication once your pain diary is completed.    ? You may shower. No swimming, tub bath or hot tub for 48 hours.  You may remove your bandaid/bandage as soon as you are home.    ? You may resume light activities, as tolerated.    ? Resume your usual diet as tolerated.    ? It is strongly advised that you do not drive for 1-3 hours post injection.    ? If you have had oral sedation:  Do not drive for 8 hours post injection.      ? If you have had IV sedation:  Do not drive for 24 hours post injection.  Do not operate hazardous machinery or make important personal/business decisions for 24 hours.      POSSIBLE STEROID SIDE EFFECTS (If steroid/cortisone was used for your procedure)    -If you experience these symptoms, it should only last for a short period      Swelling of the legs                Skin redness (flushing)       Mouth (oral) irritation     Blood sugar (glucose) levels              Sweats                      Mood changes    Headache    Sleeplessness         POSSIBLE PROCEDURE SIDE EFFECTS  -Call the Spine Center if  you are concerned    Increased Pain             Increased numbness/tingling        Nausea/Vomiting            Bruising/bleeding at site        Redness or swelling                                                Difficulty walking        Weakness             Fever greater than 100.5    *In the event of a severe headache after an epidural steroid injection that is relieved by lying down, please call the [facility] to speak with a clinical staff member*

## 2021-06-10 NOTE — TELEPHONE ENCOUNTER
----- Message from Leonidas Schroeder DO sent at 8/17/2020  7:43 AM CDT -----  MRI lumbar spine reviewed along with lumbar x-rays.  Shows the mild slippage of L4 and L5 with more significant right greater than left foraminal stenosis.  This is all stable from last imaging.  No instability from flexion to extension x-rays.    Recommendation: Continue plan of sacroiliac joint injection.

## 2021-06-10 NOTE — TELEPHONE ENCOUNTER
Phone call to pt to discuss PSP's response. Information given. Stated understanding. Patient is presently scheduled to see Dr. Schroeder on 9/1/20. She will keep that appointment as scheduled.

## 2021-06-10 NOTE — PATIENT INSTRUCTIONS - HE
1. MRI lumbar spine to evaluate for progression of the foraminal stenosis.    2.  Flexion-extension x-rays to evaluate for any instability of the spondylolisthesis.    3.  Right sacroiliac joint injection with Dr. Moreira.    [facility] Injection Requirements      A  is required for all fluoroscopically-guided injections.    Injection appointments may be cancelled if there are signs/symptoms of an active infection or if the patient is being actively treated with antibiotics for a diagnosed infection.    Patients may have their steroid injection cancelled if they have had another steroid injection within 2 weeks.    Diabetic patients will have their blood glucose levels checked the day of their injection and the appointment will be rescheduled if the blood glucose level is 300 or higher.    Patients with allergies to cortisone, local anesthetics, iodine, or contrast dye should contact the Spine Center to further discuss these considerations.    Patients scheduled for medial branch block diagnostic injections should refrain from taking pain medication the day of the procedure.  The medial branch block injection appointment will be rescheduled if the patient's pain rating is not 5/10 or greater at the time of the procedure.    Patients taking warfarin/Coumadin will have their INR checked the day of the procedure and the procedure may be rescheduled if the INR is greater than 3.0.    Please contact the Sp[facility] Center (#[phone]) if you are taking any prescription blood-thinning medications (warfarin, Plavix, Lovenox, Eliquis, Brilinta, Effient, etc.) as special dosing adjustments may need to be made depending on the type of injection you are scheduled to receive.    It is recommended that you delay having your steroid injection if you have received a flu shot or shingles vaccine within 2 weeks.

## 2021-06-10 NOTE — PROGRESS NOTES
Assessment/Plan:      Diagnoses and all orders for this visit:    Sacroiliac joint pain  -     OPS Joint Injection Sacroiliac Joint; Future; Expected date: 08/11/2020    Foraminal stenosis of lumbar region  -     MR Lumbar Spine Without Contrast; Future; Expected date: 08/11/2020    Lumbar radicular pain  -     MR Lumbar Spine Without Contrast; Future; Expected date: 08/11/2020    Spondylolisthesis of lumbar region  -     MR Lumbar Spine Without Contrast; Future; Expected date: 08/11/2020  -     XR Lumbar Spine Flex and Ext 2 or 3 VWS; Future; Expected date: 08/11/2020    Myofascial pain    Arthropathy of lumbar facet joint  -     MR Lumbar Spine Without Contrast; Future; Expected date: 08/11/2020        Assessment: Pleasant 60 y.o. female with a history of anxiety depression, hyperlipidemia, reflux with:    1.  Chronic worsening low back pain with right gluteal pain.  Most of the pain is over the right gluteal region over the SI joint PSIS into the lateral upper outer quadrant.  Consistent with proximal radicular pain versus SI pain and myofascial pain.  She has severe foraminal stenosis on the right at L4-5 related to severe facet arthropathy and mild degenerative disc disease with a trace spondylolisthesis.    2.  Significant right SI pain.  With degenerative changes which are mild of the right SI joint on MRI.      3. Spondylolisthesis L5-S1 and L4-5 related to facet arthropathy which are mild and degenerative.    4.  Gluteal myofascial pain.         Discussion:    1.  We discussed the diagnosis and treatment options.  We discussed that she did not have great results from the medial branch blocks only 60% improvement.  She has not had good results from the facet injections either.  Did have good initial benefit up to 85% from the transforaminal epidural steroid injection which makes me lean towards radicular pain is a large component along with myofascial pain but she does have some SI maneuvers today.  The exam  for the SI joint is somewhat limited due to her prior hip replacement.    2.  Right SI joint injection with Dr. Moreira will be ordered.    3.  We will update MRI of the lumbar spine evaluate for progression of the foraminal stenosis and facet arthropathy at L4-5 or spondylolisthesis.    4.  Flexion-extension x-rays will be updated to evaluate for any instability of the spondylolisthesis.    5.  Can consider neurosurgical reevaluation given the amount of stenosis at L4-5 if the SI joint injection is not helpful.    6.  Follow-up 2 to 4 weeks after injection   With Dr. Schroeder, video if able otherwise in person..      It was our pleasure caring for your patient today, if there any questions or concerns please do not hesitate to contact us.    25 minutes were spent with this patient in addition to any procedure with greater than 50% in counseling and coordination of care.    Subjective:   Patient ID: Leda Verde is a 60 y.o. female.    History of Present Illness: Patient presents for follow-up of low back pain and right gluteal region upper outer quadrant on the right.  Symptoms have worsened since last visit.  After last visit had right L4-5 transforaminal epidural steroid injection.  She had 85% relief approximately for the first few hours after the injection and the pain returned and worsened.  The pain is in the low back right SI and gluteal region.  Worse with any walking or standing for long periods bending over or doing chores.  Better with lying down.  Pain is an 8/10 at worst 5/10 today 4/10 at best.  She has been using a cane which does seem to help.  She reports being seen by Dr. Crawford in the past and had radiofrequency ablation.  I did review old records from [facility] showing trochanteric bursa injections under imaging guidance but I do not have records regarding the radiofrequency ablation.  We did review the pain diary showing only 50 to 60% improvement of her lumbar and gluteal pain after medial branch  blocks recently done here at the spine center.  She is considering getting another opinion I am more than happy for her to do so.     Imaging MRI report and images were personally reviewed and discussed with the patient.  A plastic model was utilized during the discussion.  MRI of the lumbar spine from 2019 personally reviewed showing degenerative changes lumbar spine most significant facet arthropathy L4-5 and L5-S1 with accompanying diffuse disc bulge L4-5 this results in moderate to severe right foraminal stenosis with compression of the right exiting L4 nerve.  There is also slight spondylolisthesis L4-5 and L5-S1.    Review of Systems: pertinent positives: Complains of weakness right leg, coordination of her hands are an issue related to osteoarthritis..  Pertinent negatives: No numbness or tingling .  No bowel or bladder incontinence.  No urinary retention.  No fevers, unintentional weight loss, balance changes, headaches, frequent falling, difficulty swallowing, or coordination difficulties.  All others reviewed are negative.    Prior interventions:  1.  Bilateral L4-5 facet injection did give temporary benefit.  November 25, 2019     2.  Medial branch blocks gave 50 to 60% improvement.  June 30, 2020    3.  Right L4-5 transforaminal epidural steroid injection: 85% improvement initially followed by no significant improvement.  July 28, 2020    Past Medical History:   Diagnosis Date     Anxiety      Depression      Hyperlipidemia        The following portions of the patient's history were reviewed and updated as appropriate: allergies, current medications, past family history, past medical history, past social history, past surgical history and problem list.           Objective:   Physical Exam:    Vitals:    08/11/20 0947   BP: 111/66   Pulse: 85     There is no height or weight on file to calculate BMI.      General: Alert and oriented with normal affect. Attention, knowledge, memory, and language are  intact. No acute distress.   Eyes: Sclerae are clear.  Respirations: Unlabored. CV: No lower extremity edema.  Skin: No rashes seen.    Gait:  Nonantalgic  Significant tenderness to palpation right PSIS SI joint gluteal region.  Positive SI compression.  Unable to fully perform thigh thrust Artie's test or other SI maneuvers given prior hip replacement.  Sensation is intact to light touch throughout the  lower extremities.       Manual muscle testing reveals:  Right /Left out of 5     5/5 hip flexors  5/5 knee flexors  5/5 knee extensors  5/5 ankle plantar flexors  5/5 ankle dorsiflexors  5/5  EHL

## 2021-06-11 NOTE — TELEPHONE ENCOUNTER
I spoke with Leda today in an attempt to schedule her surgery with  and she let me know she would like to put a hold on surgery at this time. She is interested in a 2nd opinion but she is also dealing with a lot of other personal issues and feels like some conservative care could help with putting off surgery at this time. I let her know to let me know if she would like to proceed with surgery or if anything were to change to let us know. I made sure she had my direct contact information and she was in agreement with this plan.

## 2021-06-11 NOTE — PROGRESS NOTES
[facility] Daily Progress     Patient Name: Leda Verde  Date: 9/30/2020  Visit #: 2  Authorization dates:  9/21/20-11/25/20  Referral Diagnosis: Lumbar radicular pain  Referring provider: Leonidas Schroeder DO  Visit Diagnosis:     ICD-10-CM    1. Lumbar radicular pain  M54.16    2. Foraminal stenosis of lumbar region  M48.061    3. Myofascial pain  M79.18    4. Arthropathy of lumbar facet joint  M47.816    5. Sacroiliac joint pain  M53.3    6. Spondylolisthesis of lumbar region  M43.16    7. Trochanteric bursitis of both hips  M70.61     M70.62        Precautions / Restrictions : anxiety, depression, h/o DAVID with multiple revisions       Assessment:     Response to Intervention:  Tolerated cuing and re-instruction for HEP well.  She is able to perform it without any increased pain.  New exercises also tolerated well    Symptoms are consistent with:  Medical diagnoses  Patient is appropriate to continue with skilled physical therapy intervention, as indicated by initial plan of care.    Goal Status:  Pt. will demonstrate/verbalize independence in self-management of condition in : 6 weeks  Pt. will be independent with home exercise program in : 6 weeks  Pt. will have improved quality of sleep: with less pain;waking less times/night;in 6 weeks  Pt. will show improved balance for safer : standing;ambulation;for dressing/grooming;for household ambulation;in 6 weeks  Pt. will improve posture : and demonstrate posture with minimal to no cuing;in 6 weeks  Pt. will be able to walk : 1 block;with less pain;with less difficulty;in 6 weeks  Patient will stand : 30 minutes;with less pain;with less difficultty;for home chores;in 6 weeks    No data recorded  Other functional progress:           Plan / Patient Education:     Continue with initial plan of care.  Progress with home program as tolerated.       Subjective:     Pain Rating:  Resting 3  Activity:  6    Response to last treatment: felt ok  HEP-  "Frequency: 2x/day, Questions or difficulties:  She is having pain with sit to stands.    Patient reports:      Exercises are going ok, but pain with sit to stand.      Objective:              Treatment Today   Manual Therapy  None today    Exercises:  Exercise #1: sit to stand--reinstruction for posture and technique.  able to perform without pain  Comment #1: 10  Exercise #2: hooklying TA set  cues to breathe normally  Comment #2: 10  Exercise #3: standing hip abduction  Comment #3: 10 bilateral  Exercise #4: standing hip flexor stretch  Comment #4: 30\" x 2 bilateral  Exercise #5: bridging  Comment #5: 10            TREATMENT MINUTES COMMENTS   Evaluation     Self-care/ Home management     Manual therapy     Neuromuscular Re-education     Therapeutic Activity     Therapeutic Exercises 25 See exercise flow-sheet for details.    Gait training     Modality__________________                Total 25    Blank areas are intentional and mean the treatment did not include these items.       Steve Santiago, PT  9/30/2020  ;  "

## 2021-06-11 NOTE — PATIENT INSTRUCTIONS - HE
Anterior Lumbar Interbody Fusion    Provided complete information about approaching surgery.  Discussed discharge planning and expected outcomes after surgery as well as follow-ups and restrictions.  Emphasized on stop taking ASA, NSAID's, vitamins and /or OTC herbal supplements within 10 days and any anticoagulant meds within 7 days prior to surgery.  Reminded patient to bring all pertinent films to hospital the day of surgery.    NPO after midnight, Please arrive 2.5 hours prior to scheduled surgery.    Using a washcloth and a bottle of provided Hibiclens, wash your body, avoiding your face and genitals. Preferably, shower the night before surgery and the morning of surgery using a half a bottle each time for your whole body shower. If you are unable to take a shower in the morning of surgery, please discuss your options with the nurse at your readiness visit.     Provided written pamphlets about surgery and Hibiclens bottle.  Answered all questions.  The  will call patient with all pre-op orders and instructions.  Patient to complete all required diagnostic tests prior to surgery.  If test are not completed this will cancel the surgery; contact the clinic nurses at [phone] if unable to complete test.

## 2021-06-11 NOTE — PROGRESS NOTES
Neurosurgery consultation was requested by: Dr. Schroeder   Pain: Low back pain   Radicular Pain is present: Bilateral   Motor complaints: Weakness in both legs   Sensory complaints: Numbness/Tingling in fingers   Gait and balance issues: Trouble walking - but related to hip replacement   Bowel or bladder issues: None   Duration of SX is: 4 years   The symptoms are worse with: Any poition too long, stairs, bending   The symptoms are better with: None   Injury: None   Severity is: Chronic   Patient has tried the following conservative measures: Injection, Is going to start PT   Krystina Bello CMA,9:31 AM

## 2021-06-11 NOTE — PROGRESS NOTES
Assessment/Plan:      Diagnoses and all orders for this visit:    Lumbar radicular pain  -     Ambulatory referral to Physical Therapy  -     Ambulatory referral to Neurosurgery    Foraminal stenosis of lumbar region  -     Ambulatory referral to Physical Therapy  -     Ambulatory referral to Neurosurgery    Myofascial pain  -     Ambulatory referral to Physical Therapy    Arthropathy of lumbar facet joint  -     Ambulatory referral to Physical Therapy    Sacroiliac joint pain  -     Ambulatory referral to Physical Therapy    Spondylolisthesis of lumbar region  -     Ambulatory referral to Physical Therapy    Trochanteric bursitis of both hips  -     Ambulatory referral to Physical Therapy        Assessment: Pleasant 60 y.o. female with a history of anxiety depression, hyperlipidemia, reflux with:     1.  Persistent right low back pain iliac crest pain and right proximal lower extremity radicular pain to the lateral knee and slightly inferior.  Consistent with right L4 radicular pain.  She has severe right foraminal stenosis at L4-5 related to facet arthropathy and degenerative disc disease with a mild spondylolisthesis.  She also has spondylolisthesis L5 on S1.    2.  Myofascial pain bilateral gluteal region greater trochanters.  May be a component of trochanteric bursitis but also likely related to prior hip replacements bilaterally      3.  Right sacroiliac joint pain.  Improved after sacroiliac joint injection.          Discussion:    1.  Discussed the diagnosis and treatment options.  Discussed the option of therapy along with bursa injections further therapeutic injection of the lumbar spine, surgical referral.    2.  Given the fact that she had at least 85% improvement of the right sided radicular pain after lumbar epidural, although it was only for a few hours, she definitely has some component of right lumbar radicular pain from the L4-5 stenosis and recommend neurosurgical evaluation to discuss treatment  options.    3.  We discussed the option of trochanteric bursa injections.  My opinion is that she should hold off for now on further steroid injections as she has had 2 injections within the past 6weeks.  She agrees with this plan.    4.  Recommend starting physical therapy for gluteal and core strengthening stabilization and some nerve glides on the right.  She would like this done at [facility].    5.  She can follow-up with me in 6 weeks time in person.  Can consider bursa injections at that time if needed.      It was our pleasure caring for your patient today, if there any questions or concerns please do not hesitate to contact us.    25 minutes were spent with this patient in addition to any procedure with greater than 50% in counseling and coordination of care.    Subjective:   Patient ID: Leda Verde is a 60 y.o. female.    History of Present Illness:Patient presents for follow-up of right SI joint pain after SI joint injection also having right iliac crest pain and right hip and leg pain down the right posterior lateral aspect of the leg to the knee and lateral knee slightly anterior lateral shin slightly below the knee.  Her SI pain has improved up to 50 to 60% since last visit in the gluteal region.  She still getting a lot of pain in the greater trochanter and gluteal region on the right greater than left.  Her pain is a 5/10 at worst 2/10 today 2/10 at best.  Worse when walking up and down stairs.  Better with relaxing in the recliner.  She is taking ibuprofen.    She has questions about greater trochanteric bursa injections.  These have been done at [facility] in the past and did offer some relief.  She wonders when these can be done and if they are appropriate.  I do not believe she has had a recent physical therapy.    Amaury has been done since last visit as well.  Reviewed with the patient below      Imaging: MRI report and images were personally reviewed and discussed with the patient.  A plastic  model was utilized during the discussion.  MRI of the lumbar spine personally reviewed.  This shows facet arthropathy L4-5 L5-S1.  Right greater than left L4-5 with 3 spondylolisthesis and L5-S1 mild spondylolisthesis.  She has severe right and mild to moderate left foraminal stenosis L4-5.    Lumbar flexion-extension x-raysShows spondylolisthesis L4-5 L5-S1 grade 1.  No abnormal motion from flexion to extension.      Review of Systems: Pertinent positives: She does have pain worse in evening and coordination problems..  Pertinent negatives: No numbness, tingling or weakness.  No bowel or bladder incontinence.  No urinary retention.  No fevers, unintentional weight loss, balance changes, headaches, frequent falling, difficulty swallowing,    All others reviewed are negative.      Prior interventions:  1.  Bilateral L4-5 facet injection did give temporary benefit.  November 25, 2019     2.  Medial branch blocks gave 50 to 60% improvement.  June 30, 2020     3.  Right L4-5 transforaminal epidural steroid injection: 85% improvement initially followed by no significant improvement.  July 28, 2020    4.  Right sacroiliac joint injection August 18, 2020-Dr. Alarcon.  50 to 60% improvement in pain.    Past Medical History:   Diagnosis Date     Anxiety      Depression      Hyperlipidemia        The following portions of the patient's history were reviewed and updated as appropriate: allergies, current medications, past family history, past medical history, past social history, past surgical history and problem list.           Objective:   Physical Exam:    Vitals:    09/01/20 1410   BP: 110/67   Pulse: 91     There is no height or weight on file to calculate BMI.      General: Alert and oriented with normal affect. Attention, knowledge, memory, and language are intact. No acute distress.   Eyes: Sclerae are clear.  Respirations: Unlabored. CV: No lower extremity edema.  Skin: No rashes seen.    Gait:   Nonantalgic    Sensation is intact to light touch throughout the  lower extremities.  Reflexes are   2+ patellar and Achilles with downgoing toes.    Manual muscle testing reveals:  Right /Left out of 5     5/5 hip flexors  5/5 knee flexors  5/5 knee extensors  5/5 ankle plantar flexors  5/5 ankle dorsiflexors  5/5  EHL

## 2021-06-12 NOTE — PROGRESS NOTES
[facility] Discharge Summary  Patient Name: Leda Verde  Date: 1/21/2021  Referral Diagnosis: Lumbar radicular pain  Referring provider: Leonidas Schroeder, DO  Visit Diagnosis:   1. Lumbar radicular pain     2. Foraminal stenosis of lumbar region     3. Myofascial pain     4. Arthropathy of lumbar facet joint     5. Sacroiliac joint pain         Goals:  No data recorded  No data recorded    Patient was seen for 3 visits physical therapy.    The patient attended therapy initially, but did not finish the therapy sessions prescribed.  Goals were not fully achieved. Explanation for goals not achieved: The patient discontinued therapy, did not return.    Therapy will be discontinued at this time.  Please see progress note dated 10/7/20 for patient status.      Thank you for your referral.  Steve Santiago, PT  1/21/2021  1:45 PM       [facility] Daily Progress     Patient Name: Leda Verde  Date: 10/7/2020  Visit #: 3  Authorization dates:  9/21/20-11/25/20  Referral Diagnosis: Lumbar radicular pain  Referring provider: Leonidas Schroeder, DO  Visit Diagnosis:     ICD-10-CM    1. Lumbar radicular pain  M54.16    2. Foraminal stenosis of lumbar region  M48.061    3. Myofascial pain  M79.18    4. Arthropathy of lumbar facet joint  M47.816    5. Sacroiliac joint pain  M53.3        Precautions / Restrictions : anxiety, depression, h/o DAVID with multiple revisions       Assessment:     Response to Intervention:  Tolerated exercises well with improved performance.  Less cuing required for exercises.  Strength improving with exercises.    Symptoms are consistent with:  Medical diagnoses  Patient is appropriate to continue with skilled physical therapy intervention, as indicated by initial plan of care.    Goal Status:  Pt. will demonstrate/verbalize independence in self-management of condition in : 6 weeks  Pt. will be independent with home exercise program in : 6 weeks  Pt. will  "have improved quality of sleep: with less pain;waking less times/night;in 6 weeks  Pt. will show improved balance for safer : standing;ambulation;for dressing/grooming;for household ambulation;in 6 weeks  Pt. will improve posture : and demonstrate posture with minimal to no cuing;in 6 weeks  Pt. will be able to walk : 1 block;with less pain;with less difficulty;in 6 weeks  Patient will stand : 30 minutes;with less pain;with less difficultty;for home chores;in 6 weeks    No data recorded  Other functional progress:           Plan / Patient Education:     Continue with initial plan of care.  Progress with home program as tolerated.       Subjective:     Pain Rating:  Resting 4  Activity:  5    Response to last treatment: felt ok  HEP- Frequency: 2x/day, Questions or difficulties:  She has to do the bridging on the floor.    Patient reports:      She feels like the injection wore off.    Not feeling very good today.  Low back and stomach pain..    She was moving furniture to get ready for a new bed.  She also did a bunch of overhead lifting to empty out of closet.      Objective:              Treatment Today   Manual Therapy  None today    Exercises:  Exercise #1: sit to stand   minimal cuing required  Comment #1: 10  Exercise #2: hooklying TA set  cues to breathe normally  Comment #2: 10\" x 10  Exercise #3: standing hip abduction  Comment #3: 10 bilateral  Exercise #4: standing hip flexor stretch  re-instruction for trunk up-right, holding onto something for balance, and holding for 30\"  Comment #4: 30\" x 2 bilateral  Exercise #5: bridging  Comment #5: 10  Exercise #6: LTR  Comment #6: 10 x bilateral            TREATMENT MINUTES COMMENTS   Evaluation     Self-care/ Home management     Manual therapy     Neuromuscular Re-education     Therapeutic Activity     Therapeutic Exercises 25 See exercise flow-sheet for details.    Gait training     Modality__________________                Total 25    Blank areas are " intentional and mean the treatment did not include these items.       Steve Santiago, PT  10/7/2020  ;

## 2021-06-17 NOTE — PATIENT INSTRUCTIONS - HE
Patient Instructions by Pramod Laurent PT at 5/7/2019  3:00 PM     Author: Pramod Laurent PT Service: -- Author Type: Physical Therapist    Filed: 5/7/2019  3:28 PM Encounter Date: 5/7/2019 Status: Addendum    : Pramod Laurent PT (Physical Therapist)    Related Notes: Original Note by Pramod Laurent PT (Physical Therapist) filed at 5/7/2019  3:23 PM        SIT TO STAND    From a regular chair, sit to stand until fatigue without using your hands. Perform 1-2x/day          CLAM SHELLS    While lying on your side with your knees bent, draw up the top knee while keeping contact of your feet together.    Do not let your pelvis roll back during the lifting movement.  Hold for 5 seconds. Do until fatigue. Perform 1x/day     It is recommended that you ice 2-3x/day for 20 minutes at a time. Remember to not apply ice directly on skin.    It's recommended that you use your cane for balance each day

## 2021-06-17 NOTE — TELEPHONE ENCOUNTER
Last appointment: 09/1/2020  Next appointment: None    Notes/Comments:      Rx request(s) has been reviewed. Rx(s) cued up for auth, to be e-scribed to pharm. Thanks.

## 2021-06-17 NOTE — PATIENT INSTRUCTIONS - HE
Patient Instructions by Pramod Laurent PT at 5/10/2019 10:30 AM     Author: Pramod Laurent PT Service: -- Author Type: Physical Therapist    Filed: 5/10/2019 10:57 AM Encounter Date: 5/10/2019 Status: Signed    : Pramod Laurent PT (Physical Therapist)        TANDEM STANCE    Stand with one foot directly in front of the other so that the toes of one foot touches the heel of the other. Maintain your balance.  If this is too difficult, take a step forward and maintain your balance in that position. Hold for 1' on each side. Perform 1-2x/day        SINGLE LEG STANCE - SLS    Stand on one leg and maintain your balance. Hold for 1' on each side. Perform 1-2x/day

## 2021-06-17 NOTE — PATIENT INSTRUCTIONS - HE
Patient Instructions by Pramod Laurent PT at 5/3/2019  2:00 PM     Author: Pramod Laurent PT Service: -- Author Type: Physical Therapist    Filed: 5/3/2019  2:49 PM Encounter Date: 5/3/2019 Status: Addendum    : Pramod Laurent PT (Physical Therapist)    Related Notes: Original Note by Pramod Laurent PT (Physical Therapist) filed at 5/3/2019  2:44 PM       It is recommended that you ice 2-3x/day for 20 minutes at a time. Remember to not apply ice directly on skin.     SINGLE KNEE TO CHEST STRETCH - SKTC    While Lying on your back,  hold your knee and gently pull it up towards your chest. Hold for 30 seconds. Do 2-3 reps on each side. Perform 1-2x/day      PIRIFORMIS STRETCH - MODIFIED    While lying on your back, hold your knee with your opposite hand and draw your knee up and over towards your opposite shoulder. Hold for 30 seconds. Do 2-3 reps on each side. Perform 1-2x/day           BUTTOCK SQUEEZE    While lying on a firm flat surface with knees bent to 90 degree, squeeze buttocks.  Hold for 5 seconds. Do 10 reps at a time. Perform throughout the day in sitting, standing, and lying down positions.      ABDOMINAL BRACING    While lying on your back, tighten your stomach muscles as you draw your navel down towards the floor. Hold for 5 seconds. Do 10 reps at a time. Perform throughout the day in sitting, standing, and lying down positions.

## 2021-06-17 NOTE — PATIENT INSTRUCTIONS - HE
Patient Instructions by Pramod Laurent PT at 5/17/2019  3:00 PM     Author: Pramod Laurent PT Service: -- Author Type: Physical Therapist    Filed: 5/17/2019  3:34 PM Encounter Date: 5/17/2019 Status: Signed    : Pramod Laurent PT (Physical Therapist)        BRACE MARCHING    While lying on your back with your knees bent,  slowly raise up one foot a few inches and then set it back down.  Next, perform on your other leg.  Use your stomach muscles to keep your spine from moving. Do 10 reps on each side, 1x/day       Bridge with hip adduction using ball: perform your bridge but squeeze a ball between your knees while you perform it. Still do 1x/day

## 2021-06-24 NOTE — TELEPHONE ENCOUNTER
Patient saw Dr. Collins back in 2016. She has not had any imaging of her back or neck since then. She stated she has lumbar spondylolisthesis.     Please order any imaging needed for the appt.

## 2021-06-26 ENCOUNTER — HEALTH MAINTENANCE LETTER (OUTPATIENT)
Age: 61
End: 2021-06-26

## 2021-06-27 NOTE — PROGRESS NOTES
Progress Notes by Pramod Laurent PT at 5/3/2019  2:00 PM     Author: Pramod Laurent PT Service: -- Author Type: Physical Therapist    Filed: 5/3/2019  3:19 PM Encounter Date: 5/3/2019 Status: Attested    : Pramod Laurent PT (Physical Therapist) Cosigner: Jessica Collins MD at 5/3/2019  9:58 PM    Attestation signed by Jessica Collins MD at 5/3/2019  9:58 PM    Jessica Collins                      [facility] Certification Request    May 3, 2019      Patient: Leda Verde  MR Number: [id number]  YOB: 1960  Date of Visit: 5/3/2019      Dear Dr. Collins,    Thank you for this referral.   We are seeing Leda Verde for Physical Therapy of spondylolisthesis of lumbar region.    Medicare and/or Medicaid requires physician review and approval of the treatment plan. Please review the plan of care and verify that you agree with the therapy plan of care by co-signing this note.      Plan of Care  Authorization / Certification Start Date: 05/03/19  Authorization / Certification End Date: 08/03/19  Authorization / Certification Number of Visits: 12  Communication with: Referral Source  Patient Related Instruction: Nature of Condition;Next steps;Expected outcome;Treatment plan and rationale;Self Care instruction;Basis of treatment;Body mechanics;Posture;Precautions  Times per Week: 1-2  Number of Weeks: 12  Number of Visits: 12  Discharge Planning: when PT goals are met  Therapeutic Exercise: ROM;Stretching;Strengthening  Neuromuscular Reeducation: core;posture;balance/proprioception  Manual Therapy: soft tissue mobilization;myofascial release;joint mobilization;muscle energy  Gait Training: with and w/o AD  Equipment: theraband      Goals:  Pt. will demonstrate/verbalize independence in self-management of condition in : 12 weeks  Pt. will be independent with home exercise program in : 12 weeks  Pt. will have improved quality of sleep: with less  pain;waking less times/night;in 6 weeks  Pt. will be able to walk : 30 minutes;with less difficulty;with less pain;for household mobility;in 12 weeks;for community mobility;for exercise/recreation  Patient will stand : 30 minutes;with less pain;with less difficultty;in 6 weeks;for home chores    Patient will decrease : CARMEN score;by _ points;for improved quality of function;for improved quality of life;in 12 weeks  by ___ points: 6        If you have any questions or concerns, please don't hesitate to call.    Sincerely,      Pramod Laurent, PT, DPT        Physician recommendation:     ___ Follow therapist's recommendation        ___ Modify therapy      *Physician co-signature indicates they certify the need for these services furnished within this plan and while under their care.        [facility]   [place] Initial Evaluation    Patient Name: Leda Verde  Date of evaluation: 5/3/2019  Referral Diagnosis: spondylolisthesis of lumbar region  Referring provider: Damien Collins*  Visit Diagnosis:     ICD-10-CM    1. Spondylolisthesis of lumbar region M43.16    2. Generalized muscle weakness M62.81    3. Unsteadiness on feet R26.81        Assessment:      Leda Verde is a 59 y.o. female who presents to therapy today with chief complaints of chronic bilateral low back pain and right hip bursitis. Symptoms began 2 1/2 years ago after pulling buckthorn.  Patient has a PMH that is positive for osteopenia, anxiety, depression, bilateral DAVID's, neck pain. Difficulty with all daily activities due to pain.  Pain symptoms are getting worse.  Patient demonstrates signs and sx consistent with significant core and LE weakness. PT POC and goals have been discussed with patient and She  is agreeable to these. Patient appears motivated for physical therapy and is appropriate for skilled therapy services.    The POC is dynamic and will be modified on an ongoing basis.  Barriers to achieving goals as  noted in the assessment section may affect outcome.  Prognosis to achieve goals is  fair   Pt. is appropriate for skilled PT intervention as outlined in the Plan of Care (POC).  Pt. is a good candidate for skilled PT services to improve pain levels and function.    Goals:  Pt. will demonstrate/verbalize independence in self-management of condition in : 12 weeks  Pt. will be independent with home exercise program in : 12 weeks  Pt. will have improved quality of sleep: with less pain;waking less times/night;in 6 weeks  Pt. will be able to walk : 30 minutes;with less difficulty;with less pain;for household mobility;in 12 weeks;for community mobility;for exercise/recreation  Patient will stand : 30 minutes;with less pain;with less difficultty;in 6 weeks;for home chores    Patient will decrease : CARMEN score;by _ points;for improved quality of function;for improved quality of life;in 12 weeks  by ___ points: 6      Patient's expectations/goals are realistic.    Barriers to Learning or Achieving Goals:  Chronicity of the problem.  Co-morbidities or other medical factors.  see [facility]       Plan / Patient Instructions:        Plan of Care:   Authorization / Certification Start Date: 05/03/19  Authorization / Certification End Date: 08/03/19  Authorization / Certification Number of Visits: 12  Communication with: Referral Source  Patient Related Instruction: Nature of Condition;Next steps;Expected outcome;Treatment plan and rationale;Self Care instruction;Basis of treatment;Body mechanics;Posture;Precautions  Times per Week: 1-2  Number of Weeks: 12  Number of Visits: 12  Discharge Planning: when PT goals are met  Therapeutic Exercise: ROM;Stretching;Strengthening  Neuromuscular Reeducation: core;posture;balance/proprioception  Manual Therapy: soft tissue mobilization;myofascial release;joint mobilization;muscle energy  Gait Training: with and w/o AD  Equipment: theraband      Exercises:  Exercise #1: SKTC and piriformis stretch  "30\" holds x3 B  Comment #1: glut squeeze x10 with 5\" holds  Exercise #2: abd set x10 with 5\" holds      Plan for next visit: discuss icing recommendations, give picture of heel lift, talk about SEC for balance, clamshell, rhomberg/ 1/2 tandem, sit to stands for HW     Subjective:         Social information:   Occupation: disabled   Hobbies: take care of pets, walking, house work     here and interjecting through most of subjective portion of today's session      History of Present Illness:    Leda Verde is a 59 y.o. female who presents to therapy today with complaints of right lower back pain. Date of onset/duration of symptoms is 2 1/2 years ago. Onset was sudden after digging up buckthorn. Symptoms are constant and getting worse. No significant back injuries prior to digging up buckthorn. Doesn't ice. Sometimes uses heat. Prescription strength tylenol from [facility] and Flexeril seem to help with pain.    PMH: B hip replacements, L hip had 2 revisions, neck issues    Pain Ratin  Pain rating at best: 3  Pain rating at worst: 6  Pain description: sharp, dull, achy, stabbing, tingling into R bursitis area    Functional limitations are described as occurring with: essentially all activities         Objective:      Note: Items left blank indicates the item was not performed or not indicated at the time of the evaluation.    Patient Outcome Measures :    Modified Oswestry Low Back Pain Disablity Questionnaire  in %: 71     Scores range from 0-100%, where a score of 0% represents minimal pain and maximal function. The minimal clinically important difference is a score reduction of 12%.    Examination  1. Spondylolisthesis of lumbar region     2. Generalized muscle weakness     3. Unsteadiness on feet         Involved side: Right  Posture Observation: fair  Gait: no AD  Sit to stand: 6.5x/30 seconds without use of UE's    Lumbar ROM:    Date:      *Indicate scale AROM AROM AROM   Lumbar Flexion Mid " tibia, pain-free     Lumbar Extension WFL, pain-free      Right Left Right Left Right Left   Lumbar Sidebending Increase in pain about 2 inches above R ASIS, pain-free WFL, pain-free       Lumbar Rotation WFL, pain-free WFL, pain-free       Thoracic Flexion      Thoracic Extension      Thoracic Sidebending         Thoracic Rotation           Lower Extremity Strength:     Date:      LE strength/5 Right Left Right Left Right Left   Hip Flexion (L1-3) 4 4-       Hip Extension (L5-S1)         Hip Abduction (L4-5) 4 4       Hip Adduction (L2-3) 4 4       Hip External Rotation         Hip Internal Rotation         Knee Extension (L3-4) 4 4       Knee Flexion         Ankle Dorsiflexion (L4-5)         Great Toe Extension (L5)         Ankle Plantar flexion (S1)         Abdominals        Sensation    Intact per subjective    Palpation: tender R greater troch, R of L4-5    R leg is slightly longer than her left    Lumbar Special Tests:     Lumbar Special Tests Right Left SI Tests Right  Left   Quadrant test   SI Compression     Straight leg raise 90- neg 80, neg SI Distraction     Crossover response - - POSH Test - -   Slump - - Sacral Thrust     Sit-up test  FADIR -    Trunk extensor endurance test  Resisted Abduction - -   Prone instability test  Other:     Pubic shotgun  Other:       Repeated Motion Testing:  Does not peripheralize      LE Screen:  Hip ROM B pain-free and expected for DAVID B    Treatment Today     TREATMENT MINUTES COMMENTS   Evaluation 25 Low back   Self-care/ Home management     Manual therapy     Neuromuscular Re-education     Therapeutic Activity     Therapeutic Exercises 25 Discussed PT POC and pathology of condition. Answered patient questions. Began HEP-see flowsheet.    Gait training     Modality__________________                Total 50    Blank areas are intentional and mean the treatment did not include these items.            PT Evaluation Code: (Please list factors)  Patient  History/Comorbidities: see PMH  Examination: spondylolisthesis of lumbar region  Clinical Presentation: stable  Clinical Decision Making: low    Patient History/  Comorbidities Examination  (body structures and functions, activity limitations, and/or participation restrictions) Clinical Presentation Clinical Decision Making (Complexity)   No documented Comorbidities or personal factors 1-2 Elements Stable and/or uncomplicated Low   1-2 documented comorbidities or personal factor 3 Elements Evolving clinical presentation with changing characteristics Moderate   3-4 documented comorbidities or personal factors 4 or more Unstable and unpredictable High              Pramod Laurent, PT, DPT  5/3/2019  2:04 PM

## 2021-06-29 NOTE — PROGRESS NOTES
Progress Notes by Steve Santiago PT at 9/21/2020  4:45 PM     Author: Steve Santiago PT Service: -- Author Type: Physical Therapist    Filed: 9/21/2020  6:46 PM Encounter Date: 9/21/2020 Status: Attested    : Steve Santiago PT (Physical Therapist) Cosigner: Leonidas Schroeder DO at 9/22/2020  7:42 AM    Attestation signed by Leonidas Schroeder DO at 9/22/2020  7:42 AM    Follow the therapist's recommendation                    [facility] Certification Request    September 21, 2020      Patient: Leda Verde  MR Number: [id number]  YOB: 1960  Date of Visit: 9/21/2020      Dear Leonidas Singh, :    Thank you for this referral.   We are seeing Leda Verde in Physical Therapy for Lumbar radicular pain.    Medicare and/or Medicaid requires physician review and approval of the treatment plan. Please review the plan of care and verify that you agree with the therapy plan of care by co-signing this note.      Plan of Care  Authorization / Certification Start Date: 09/21/20  Authorization / Certification End Date: 11/25/20  Authorization / Certification Number of Visits: 10  Communication with: Referral Source  Patient Related Instruction: Nature of Condition;Posture;Precautions;Treatment plan and rationale;Next steps;Self Care instruction;Expected outcome;Basis of treatment;Body mechanics  Times per Week: 1-2  Number of Weeks: 8  Number of Visits: 10  Discharge Planning: discharge PT when goals are met or if progress plateaus  Precautions / Restrictions : anxiety, depression, h/o DAVID with multiple revisions  Therapeutic Exercise: ROM;Stretching;Strengthening  Neuromuscular Reeducation: posture;balance/proprioception;core  Manual Therapy: soft tissue mobilization;myofascial release;joint mobilization      Goals:  Pt. will demonstrate/verbalize independence in self-management of condition in : 6 weeks  Pt. will be independent with home exercise program in : 6  weeks  Pt. will have improved quality of sleep: with less pain;waking less times/night;in 6 weeks  Pt. will show improved balance for safer : standing;ambulation;for dressing/grooming;for household ambulation;in 6 weeks  Pt. will improve posture : and demonstrate posture with minimal to no cuing;in 6 weeks  Pt. will be able to walk : 1 block;with less pain;with less difficulty;in 6 weeks  Patient will stand : 30 minutes;with less pain;with less difficultty;for home chores;in 6 weeks    No data recorded      If you have any questions or concerns, please don't hesitate to call.    Sincerely,      Steve Santiago, PT      Physician:    For Medicare/MA patients:      Physician recommendation:     ___ Follow therapist's recommendation        ___ Modify therapy      *Physician co-signature indicates they certify the need for these services furnished within this plan and while under their care.       [facility]   [facility]-Pelvic Initial Evaluation    Patient Name: Leda Verde  Date of evaluation: 9/21/2020  Referral Diagnosis: Lumbar radicular pain  Referring provider: Leonidas Schroeder, DO  Visit Diagnosis:     ICD-10-CM    1. Lumbar radicular pain  M54.16    2. Foraminal stenosis of lumbar region  M48.061    3. Myofascial pain  M79.18    4. Arthropathy of lumbar facet joint  M47.816    5. Sacroiliac joint pain  M53.3    6. Spondylolisthesis of lumbar region  M43.16    7. Trochanteric bursitis of both hips  M70.61     M70.62        Precautions / Restrictions : anxiety, depression, h/o DAVID with multiple revisions       Assessment:      Impairments in  pain, posture, ROM, joint mobility, strength, gait/locomotion and balance  Patient's signs and symptoms are consistent with medical diagnoses.  Patient also has some anxiety about attending medical appointments during pandemic.  Patient responded well to initiation.  Prognosis to achieve goals is  good   Pt. is appropriate for skilled PT intervention as  outlined in the Plan of Care (POC).    Goals:  Pt. will demonstrate/verbalize independence in self-management of condition in : 6 weeks  Pt. will be independent with home exercise program in : 6 weeks  Pt. will have improved quality of sleep: with less pain;waking less times/night;in 6 weeks  Pt. will show improved balance for safer : standing;ambulation;for dressing/grooming;for household ambulation;in 6 weeks  Pt. will improve posture : and demonstrate posture with minimal to no cuing;in 6 weeks  Pt. will be able to walk : 1 block;with less pain;with less difficulty;in 6 weeks  Patient will stand : 30 minutes;with less pain;with less difficultty;for home chores;in 6 weeks    No data recorded    Barriers to Learning or Achieving Goals:  No Barriers.    Patient's expectations/goals are realistic.    A shared decision making model was used.  The patient's values and choices were respected.  The following represents what was discussed and decided upon by the physical therapist and the patient.          Plan / Patient Instructions:        Plan of Care:   Authorization / Certification Start Date: 09/21/20  Authorization / Certification End Date: 11/25/20  Authorization / Certification Number of Visits: 10  Communication with: Referral Source  Patient Related Instruction: Nature of Condition;Posture;Precautions;Treatment plan and rationale;Next steps;Self Care instruction;Expected outcome;Basis of treatment;Body mechanics  Times per Week: 1-2  Number of Weeks: 8  Number of Visits: 10  Discharge Planning: discharge PT when goals are met or if progress plateaus  Precautions / Restrictions : anxiety, depression, h/o DAVID with multiple revisions  Therapeutic Exercise: ROM;Stretching;Strengthening  Neuromuscular Reeducation: posture;balance/proprioception;core  Manual Therapy: soft tissue mobilization;myofascial release;joint mobilization      POC and pathology of condition were reviewed with patient.  Pt. is in agreement with  the Plan of Care  A Home Exercise Program (HEP) was initiated today.  Pt. was instructed in exercises by PT and patient was given a handout with detailed instructions.    Plan for next visit: review HEP. Progress as tolerated.  Possible trial of MFR.     Subjective:           History of Present Illness:    Leda, who goes by Kar, is a 60 y.o. female who presents to therapy today with complaints of upper and lower back pain that radiates down right leg. Date of onset:  Chronic, >5 years.  PT ordered 2020 . Onset was gradual. Symptoms are constant. She denies history of similar symptoms. She describes their previous level of function as not limited    Pain Ratin  Pain rating at best: 3  Pain rating at worst: 7  Pain description: aching, burning, pain, sharp, shooting and soreness    Functional limitations are described as occurring with:   ascending and descending stairs or curbs  balance  bending  lifting  reaching    sitting    sleeping  standing    transitional movements sit to stand and sit to supine  walking             Objective:      Note: Items left blank indicates the item was not performed or not indicated at the time of the evaluation.    Patient Outcome Measures :    Modified Oswestry Low Back Pain Disablity Questionnaire  in %: 66     Scores range from 0-100%, where a score of 0% represents minimal pain and maximal function. The minimal clinically important difference is a score reduction of 12%.    Examination  1. Lumbar radicular pain     2. Foraminal stenosis of lumbar region     3. Myofascial pain     4. Arthropathy of lumbar facet joint     5. Sacroiliac joint pain     6. Spondylolisthesis of lumbar region     7. Trochanteric bursitis of both hips       Precautions / Restrictions : anxiety, depression, h/o DAVID with multiple revisions     Involved side: Bilateral  Posture Observation:      Cervical:  Moderate forward head  Shoulder/Thoracic complex: Moderate bilateral scapular protraction    Moderately increased upper thoracic kyphosis  Lumbopelvic complex: anterior pelvic tilt    Lumbar ROM:    Date: 09/21/20      *Indicate scale AROM AROM AROM   Lumbar Flexion min     Lumbar Extension mod      Right Left Right Left Right Left   Lumbar Sidebending mod mod       Lumbar Rotation min min       Thoracic Flexion      Thoracic Extension      Thoracic Sidebending         Thoracic Rotation           Lower Extremity Strength:    Date: 09/21/20      LE strength/5 Right Left Right Left Right Left   Hip Flexion (L1-3) 4 4       Hip Extension (L5-S1) 4 4       Hip Abduction (L4-5) 4- 4-       Hip Adduction (L2-3) 4- 4-       Hip External Rotation         Hip Internal Rotation         Knee Extension (L3-4) 5 5       Knee Flexion 5 5       Ankle Dorsiflexion (L4-5) 5 5       Great Toe Extension (L5)         Ankle Plantar flexion (S1)         Abdominals fair       Sensation            Reflex Testing:     Lumbar Dermatomes Right Left UE Reflexes Right Left   Iliac Crest and Groin (L1)   Biceps (C5-6)     Anterior Medial Thigh (L2)   Brachioradialis (C5-6)     Anterior Thigh, Medial Epicondyle Femur (L3)   Triceps (C7-8)     Lateral Thigh, Anterior Knee, Medial Leg/Malleolus (L4)   Hoffmanns test     Lateral Leg, Dorsal Foot (L5)   LE Reflexes     Lateral Foot (S1)   Patellar (L3-4)     Posterior Leg (S2)   Achilles (S1-2)     Other:   Babinski Response       Palpation:  NT    Flexibility:  Limited hamstring, quad, hip flexors    Lumbar Special Tests:      Lumbar Special Tests Right Left SI Tests Right  Left   Quadrant test   SI Compression     Straight leg raise - - SI Distraction     Crossover response - - POSH Test     Slump - - Sacral Thrust     Sit-up test  FADIR     Trunk extensor endurance test  Resisted Abduction     Prone instability test  Other:     Pubic shotgun - Other:       Repeated Motion Testing:  NT    Passive Mobility - Joint Integrity:  Not tested    LE Screen:  Hip PROM:  Limited extension, IR.   Hip Scour:  NT.    Treatment Today     Exercises:  Exercise #1: sit to stand  Comment #1: 10  Exercise #2: hooklying TA set  Comment #2: 10  Exercise #3: supine hip abduction  Comment #3: 10, instruction for standing hip abduction also provided         Manual therapy  None today    TREATMENT MINUTES COMMENTS   Evaluation 40    Self-care/ Home management     Manual therapy       Neuromuscular Re-education     Therapeutic Activity     Therapeutic Exercises 15    Gait training     Modality__________________                Total 55    Blank areas are intentional and mean the treatment did not include these items.     PT Evaluation Code: (Please list factors)  Patient History/Comorbidities: There is no problem list on file for this patient.     Past Medical History:   Diagnosis Date   ? Anxiety    ? Depression    ? Hyperlipidemia       Examination: as above  Clinical Presentation: stable  Clinical Decision Making: low complexity    Patient History/  Comorbidities Examination  (body structures and functions, activity limitations, and/or participation restrictions) Clinical Presentation Clinical Decision Making (Complexity)   No documented Comorbidities or personal factors 1-2 Elements Stable and/or uncomplicated Low   1-2 documented comorbidities or personal factor 3 Elements Evolving clinical presentation with changing characteristics Moderate   3-4 documented comorbidities or personal factors 4 or more Unstable and unpredictable High               Steve Santiago, PT  9/21/2020  5:09 PM

## 2021-07-03 NOTE — ADDENDUM NOTE
Addendum Note by Marlene Caldera DO at 1/7/2020  2:00 PM     Author: Marlene aCldera DO Service: -- Author Type: Physician    Filed: 1/10/2020  5:01 PM Date of Service: 1/7/2020  2:00 PM Status: Signed    : Marlene Caldera DO (Physician)    Encounter addended by: Marlene Caldera DO on: 1/10/2020  5:01   PM      Actions taken: Charge Capture section accepted

## 2021-10-16 ENCOUNTER — HEALTH MAINTENANCE LETTER (OUTPATIENT)
Age: 61
End: 2021-10-16

## 2021-11-02 NOTE — PROGRESS NOTES
NEUROSURGERY CONSULTATION NOTE    9/15/2020     Leda Verde is a 60 y.o. female who is sent to us in consultation by Leonidas Schroeder for evaluation of lumbar radiculopathy, lumbar spondylolisthesis.          CONSULTATION ASSESSMENT AND PLAN:    61 yo female who presents with b/l leg pain and weakness R>L. MRI of her lumbar spine shows anterolisthesis of L4 on L5 with moderate b/lf foraminal narrowing and mild canal narrowing. Lumbar xray shows grade 1 anterolisthesis of L4-5 and L5-S1 with no dynamic instability. Recommend L4-5 anterior lumbar interbody fusion and posterior minimally invasive fusion. Recommend vitamin D levels prior. Risks and benefits discussed in detail. She may hold on having surgery. She will call if she wishes to proceed.     I spent more than 45 minutes in this apt, examining the pt, reviewing the scans, reviewing notes from chart, discussing treatment options with risks and benefits and coordinating care. >50 % clinic time was spent in face to face counseling and coordinating care    Ivonne Caban     HPI:  61 yo female who presents with b/l leg pain and weakness R>L. Symptoms began 4 years ago. Pain travels down to her buttock and then is mostly located in her medial thighs. She denies any pain in her lateral or anterior legs. No numbness or tingling in her legs, bowel or bladder dysfunction, saddle anesthesia.     NSAIDs and laying down improve her pain. Walking, stair walking (up/down), bending worsen her pain. Recent right SI joint injection on 8/18/20 with a little relief, right L4-5 TFESI on 7/28/20 immediate relief but very short lived. Scheduled to restart PT shortly.     Prior Spine Surgery:no. Hx of osteopenia. Last DEXA in 2019.     Past Medical History:   Diagnosis Date     Anxiety      Depression      Hyperlipidemia      Past Surgical History:   Procedure Laterality Date     HIP ARTHROPLASTY Bilateral              REVIEW OF SYSTEMS:  ROS reviewed with pt as  documented on pt health form of 9/15/2020.      No family hx of anesthetic reactions  .  No family hx of hypercoagulability .       MEDICATIONS:  Current Outpatient Medications   Medication Sig Dispense Refill     buPROPion (WELLBUTRIN XL) 150 MG 24 hr tablet TAKE 1 TABLET BY MOUTH EVERY DAY IN THE MORNING  1     clonazePAM (KLONOPIN) 0.5 MG tablet Take 0.5 mg by mouth.       cyclobenzaprine (FLEXERIL) 5 MG tablet Take 1 tablet (5 mg total) by mouth 3 (three) times a day as needed for muscle spasms. 30 tablet 1     diclofenac sodium (VOLTAREN) 1 % Gel Apply 2 g topically 2 (two) times a day as needed. Apply sparingly to affected area twice daily as needed for pain. 100 g 1     DULoxetine (CYMBALTA) 60 MG capsule Take 60 mg by mouth daily.       esomeprazole magnesium (NEXIUM ORAL) Take by mouth.       famotidine (PEPCID) 40 MG tablet Take 40 mg by mouth daily.       gabapentin (NEURONTIN) 300 MG capsule 1 tab in the AM,  2-3 tablets by mouth at bedtime.       linaCLOtide (LINZESS) 290 mcg cap capsule Take 290 mcg by mouth daily.       methocarbamoL (ROBAXIN) 500 MG tablet Take 1 tablet (500 mg total) by mouth 3 (three) times a day as needed. 30 tablet 0     omega-3 fatty acids-vitamin E (FISH OIL) 1,000 mg cap Take 1 capsule by mouth.       traZODone (DESYREL) 50 MG tablet Take by mouth.       UNABLE TO FIND Med Name: Nutrafol (hair growth supplement)       valACYclovir (VALTREX) 500 MG tablet Take 500 mg by mouth.       No current facility-administered medications for this visit.          ALLERGIES/SENSITIVITIES:     Allergies   Allergen Reactions     Fentanyl Nausea Only     Prednisolone Other (See Comments)     Mood changes, trouble sleeping.       PERTINENT SOCIAL HISTORY:   Social History     Socioeconomic History     Marital status:      Spouse name: None     Number of children: None     Years of education: None     Highest education level: None   Occupational History     None   Social Needs      "Financial resource strain: None     Food insecurity     Worry: None     Inability: None     Transportation needs     Medical: None     Non-medical: None   Tobacco Use     Smoking status: Former Smoker     Smokeless tobacco: Never Used   Substance and Sexual Activity     Alcohol use: No     Drug use: No     Sexual activity: None   Lifestyle     Physical activity     Days per week: None     Minutes per session: None     Stress: None   Relationships     Social connections     Talks on phone: None     Gets together: None     Attends Church service: None     Active member of club or organization: None     Attends meetings of clubs or organizations: None     Relationship status: None     Intimate partner violence     Fear of current or ex partner: None     Emotionally abused: None     Physically abused: None     Forced sexual activity: None   Other Topics Concern     None   Social History Narrative     None         FAMILY HISTORY:  Family History   Problem Relation Age of Onset     Cancer Mother      Cancer Father         PHYSICAL EXAM:   Constitutional: /78   Pulse 84   Resp 16   Ht 5' 4\" (1.626 m)   Wt 167 lb (75.8 kg)   SpO2 96%   BMI 28.67 kg/m       Mental Status: A & O in no acute distress.  Affect is appropriate.  Speech is fluent.  Recent and remote memory are intact.  Attention span and concentration are normal.     Cranial Nerves: CN1: grossly intact per patient recall. CN2: No funduscopic exam performed. CN3,4 & 6: Pupillary light response, lateral and vertical gaze normal.  No nystagmus.  Visual fields are full to confrontation. CN5: Intact to touch CN7: No facial weakness, smile, facial symmetry intact. CN8: Intact to spoken voice. CN9&10: Gag reflex, uvula midline, palate rises with phonation. CN11: Shoulder shrug 5/5 intact bilaterally. CN12: Tongue midline and moves freely from side to side.     Motor:  Normal bulk and tone all muscle groups of upper and lower extremities.      Sensory: " Sensation intact bilaterally to light touch.      Coordination:  Heel/toe/  gait intact.    intact  tandem gait      Reflexes; supinator, biceps, triceps, knee/ ankle jerk intact- left knee absent. no hoffmans/ no    babinski/ clonus.  - SLR b/l     IMAGING: I personally reviewed all radiographic images      Cc:   Tr Wills MD  [address] [place] [id number]   Anesthesia #2 Type: 1% lidocaine with 1:100,000 epinephrine and 408mcg clindamycin/ml and a 1:10 solution of 8.4% sodium bicarbonate Procedure Start: 1108 Anesthesia #4 Volume In Cc: 0 Nurse: Sathya Weiss RN Pre-Op Respirations (Optional): 17 Time Discharged: 0755 Time In Operating Room: 1042 Intra-Op Pulse (Optional): 74 Time Given #2: 1053 to right nasal sidewall Dicharge Condition: Stable Body Location Override (Optional): right nasal sidewall, Right inferior malar cheek Intra-Op Pulse Ox (Optional): 98% Detail Level: Detailed Time Given #1: 1053 to right inferior malar cheek Time 'time-Out' Performed: 6918 Patient Discharged To: Self Anesthesia #2 Volume In Cc: 2 Discharge Respirations (Optional): 16 Preoperative Diagnosis (For...Diagnosis Name): s/p Mohs Micrographic Surgery for Intra-Op Blood Pressure (Optional): 138/76 Site Marked?: Yes Pre-Op Pulse Ox (Optional): 97 Surgeon: Dr. Silas Willett Intra-Op Respirations (Optional): 15 Discharge Pulse (Optional): 76 Asa Clasification: II Assistant: Justino Redmond MA Procedure End: 8887 Discharge Blood Pressure (Optional): 134/74 Anesthesia #1 Volume In Cc: 9 Pre-Op Blood Pressure (Optional): 148/89

## 2022-07-23 ENCOUNTER — HEALTH MAINTENANCE LETTER (OUTPATIENT)
Age: 62
End: 2022-07-23

## 2022-10-01 ENCOUNTER — HEALTH MAINTENANCE LETTER (OUTPATIENT)
Age: 62
End: 2022-10-01

## 2023-08-06 ENCOUNTER — HEALTH MAINTENANCE LETTER (OUTPATIENT)
Age: 63
End: 2023-08-06